# Patient Record
Sex: FEMALE | Race: WHITE | NOT HISPANIC OR LATINO | Employment: FULL TIME | URBAN - METROPOLITAN AREA
[De-identification: names, ages, dates, MRNs, and addresses within clinical notes are randomized per-mention and may not be internally consistent; named-entity substitution may affect disease eponyms.]

---

## 2017-02-27 DIAGNOSIS — E03.8 OTHER SPECIFIED HYPOTHYROIDISM: ICD-10-CM

## 2017-02-27 RX ORDER — LEVOTHYROXINE SODIUM 25 UG/1
TABLET ORAL
Qty: 30 TABLET | Refills: 0 | Status: SHIPPED | OUTPATIENT
Start: 2017-02-27 | End: 2017-04-19

## 2017-02-27 RX ORDER — LEVOTHYROXINE SODIUM 200 UG/1
200 TABLET ORAL DAILY
Qty: 30 TABLET | Refills: 0 | Status: SHIPPED | OUTPATIENT
Start: 2017-02-27 | End: 2017-04-19

## 2017-02-27 NOTE — TELEPHONE ENCOUNTER
Last office visit with Dr. Dumont 12/1/15  Future appointment currently not scheduled.    TSH   Date Value Ref Range Status   02/12/2016 2.19 0.40 - 4.00 mU/L Final     Medication is being filled for 1 time refill only due to:  Future labs ordered today. Patient needs to be seen because it has been more than one year since last visit.   Left message with patient on identifiable voice mail that labs are due with annual visit.  Patient should call to schedule appointment.    Larisa Rose   [facility]  RN

## 2017-03-03 ENCOUNTER — TELEPHONE (OUTPATIENT)
Dept: FAMILY MEDICINE | Facility: CLINIC | Age: 52
End: 2017-03-03

## 2017-03-03 NOTE — TELEPHONE ENCOUNTER
RN hypertension panel management  Offer free BP CK with the RN  Left message for the patient to call the clinic.  Anu ARROYO RN

## 2017-03-03 NOTE — LETTER
March 7, 2017      Estella Mary  [address]  [place] [address]              Dear Estella,    Your most recent blood pressure reading preformed at our clinic was higher than we like to see it. The goal is to have it under 140/90 in clinic. Please call our clinic [phone] to schedule a blood pressure recheck on our RN schedule. This appointment is free of charge and takes about 15 minutes to complete. Be sure to take all of your blood pressure medications, and avoid stimulants like caffeine, cold medicines, sudafed, or tobacco prior to your recheck.    If your blood pressure medication was changed by your provider recently wait 2 weeks before making this recheck appointment.     Thank you for trusting us with your health care.  Sincerely,    Etelvina Townsend MD/ Anu ARROYO RN

## 2017-04-10 DIAGNOSIS — I10 ESSENTIAL HYPERTENSION: ICD-10-CM

## 2017-04-10 NOTE — TELEPHONE ENCOUNTER
HCTZ      Last Written Prescription Date: 12/01/16  Last Fill Quantity: 90, # refills: 0  Last Office Visit with [facility] or [facility] prescribing provider: 09/08/16       Potassium   Date Value Ref Range Status   12/17/2015 3.7 3.4 - 5.3 mmol/L Final     Creatinine   Date Value Ref Range Status   12/17/2015 0.94 0.52 - 1.04 mg/dL Final     BP Readings from Last 3 Encounters:   09/08/16 (!) 145/94   05/16/16 138/88   03/23/16 (!) 144/93

## 2017-04-13 RX ORDER — HYDROCHLOROTHIAZIDE 25 MG/1
TABLET ORAL
Qty: 30 TABLET | Refills: 0 | Status: SHIPPED | OUTPATIENT
Start: 2017-04-13 | End: 2017-04-19

## 2017-04-19 ENCOUNTER — RESULT FOLLOW UP (OUTPATIENT)
Dept: FAMILY MEDICINE | Facility: CLINIC | Age: 52
End: 2017-04-19

## 2017-04-19 ENCOUNTER — OFFICE VISIT (OUTPATIENT)
Dept: FAMILY MEDICINE | Facility: CLINIC | Age: 52
End: 2017-04-19
Payer: COMMERCIAL

## 2017-04-19 VITALS
HEIGHT: 66 IN | SYSTOLIC BLOOD PRESSURE: 142 MMHG | HEART RATE: 79 BPM | WEIGHT: 263 LBS | DIASTOLIC BLOOD PRESSURE: 90 MMHG | BODY MASS INDEX: 42.27 KG/M2 | TEMPERATURE: 96.2 F

## 2017-04-19 DIAGNOSIS — Z80.41 FAMILY HISTORY OF OVARIAN CANCER: ICD-10-CM

## 2017-04-19 DIAGNOSIS — Z13.6 CARDIOVASCULAR SCREENING; LDL GOAL LESS THAN 130: ICD-10-CM

## 2017-04-19 DIAGNOSIS — E66.01 MORBID OBESITY DUE TO EXCESS CALORIES (H): ICD-10-CM

## 2017-04-19 DIAGNOSIS — Z98.890 S/P LEEP OF CERVIX: ICD-10-CM

## 2017-04-19 DIAGNOSIS — Z91.013 SEAFOOD ALLERGY: ICD-10-CM

## 2017-04-19 DIAGNOSIS — I10 ESSENTIAL HYPERTENSION: ICD-10-CM

## 2017-04-19 DIAGNOSIS — Z82.49 FAMILY HISTORY OF ISCHEMIC HEART DISEASE: ICD-10-CM

## 2017-04-19 DIAGNOSIS — E03.8 OTHER SPECIFIED HYPOTHYROIDISM: ICD-10-CM

## 2017-04-19 DIAGNOSIS — Z01.419 PAP SMEAR, LOW-RISK: ICD-10-CM

## 2017-04-19 DIAGNOSIS — E03.9 ACQUIRED HYPOTHYROIDISM: ICD-10-CM

## 2017-04-19 DIAGNOSIS — I10 BENIGN ESSENTIAL HYPERTENSION: ICD-10-CM

## 2017-04-19 DIAGNOSIS — Z13.1 SCREENING FOR DIABETES MELLITUS: Primary | ICD-10-CM

## 2017-04-19 DIAGNOSIS — Z87.42 HX OF ABNORMAL CERVICAL PAP SMEAR: ICD-10-CM

## 2017-04-19 LAB
ANION GAP SERPL CALCULATED.3IONS-SCNC: 6 MMOL/L (ref 3–14)
BUN SERPL-MCNC: 14 MG/DL (ref 7–30)
CALCIUM SERPL-MCNC: 9.2 MG/DL (ref 8.5–10.1)
CANCER AG125 SERPL-ACNC: 8 U/ML (ref 0–30)
CHLORIDE SERPL-SCNC: 100 MMOL/L (ref 94–109)
CHOLEST SERPL-MCNC: 240 MG/DL
CO2 SERPL-SCNC: 31 MMOL/L (ref 20–32)
CREAT SERPL-MCNC: 0.88 MG/DL (ref 0.52–1.04)
GFR SERPL CREATININE-BSD FRML MDRD: 69 ML/MIN/1.7M2
GLUCOSE SERPL-MCNC: 87 MG/DL (ref 70–99)
HDLC SERPL-MCNC: 65 MG/DL
LDLC SERPL CALC-MCNC: 147 MG/DL
NONHDLC SERPL-MCNC: 175 MG/DL
POTASSIUM SERPL-SCNC: 3.8 MMOL/L (ref 3.4–5.3)
SODIUM SERPL-SCNC: 137 MMOL/L (ref 133–144)
TRIGL SERPL-MCNC: 141 MG/DL
TSH SERPL DL<=0.005 MIU/L-ACNC: 0.71 MU/L (ref 0.4–4)

## 2017-04-19 PROCEDURE — 88175 CYTOPATH C/V AUTO FLUID REDO: CPT | Performed by: NURSE PRACTITIONER

## 2017-04-19 PROCEDURE — 86304 IMMUNOASSAY TUMOR CA 125: CPT | Performed by: NURSE PRACTITIONER

## 2017-04-19 PROCEDURE — 80061 LIPID PANEL: CPT | Performed by: NURSE PRACTITIONER

## 2017-04-19 PROCEDURE — 88141 CYTOPATH C/V INTERPRET: CPT | Performed by: NURSE PRACTITIONER

## 2017-04-19 PROCEDURE — 99396 PREV VISIT EST AGE 40-64: CPT | Performed by: NURSE PRACTITIONER

## 2017-04-19 PROCEDURE — 84443 ASSAY THYROID STIM HORMONE: CPT | Performed by: NURSE PRACTITIONER

## 2017-04-19 PROCEDURE — 36415 COLL VENOUS BLD VENIPUNCTURE: CPT | Performed by: NURSE PRACTITIONER

## 2017-04-19 PROCEDURE — 80048 BASIC METABOLIC PNL TOTAL CA: CPT | Performed by: NURSE PRACTITIONER

## 2017-04-19 PROCEDURE — 87624 HPV HI-RISK TYP POOLED RSLT: CPT | Performed by: NURSE PRACTITIONER

## 2017-04-19 RX ORDER — LEVOTHYROXINE SODIUM 25 UG/1
TABLET ORAL
Qty: 90 TABLET | Refills: 3 | Status: SHIPPED | OUTPATIENT
Start: 2017-04-19 | End: 2024-03-16

## 2017-04-19 RX ORDER — LEVOTHYROXINE SODIUM 25 UG/1
TABLET ORAL
Qty: 90 TABLET | Refills: 3 | Status: SHIPPED | OUTPATIENT
Start: 2017-04-19 | End: 2017-05-19

## 2017-04-19 RX ORDER — LEVOTHYROXINE SODIUM 200 UG/1
200 TABLET ORAL DAILY
Qty: 90 TABLET | Refills: 3 | Status: SHIPPED | OUTPATIENT
Start: 2017-04-19

## 2017-04-19 RX ORDER — EPINEPHRINE 0.3 MG/.3ML
0.3 INJECTION SUBCUTANEOUS
Qty: 0.3 ML | Refills: 0 | Status: SHIPPED | OUTPATIENT
Start: 2017-04-19

## 2017-04-19 RX ORDER — HYDROCHLOROTHIAZIDE 25 MG/1
25 TABLET ORAL DAILY
Qty: 90 TABLET | Refills: 3 | Status: SHIPPED | OUTPATIENT
Start: 2017-04-19 | End: 2018-04-09

## 2017-04-19 NOTE — LETTER
November 15, 2017      Estella BLAINE Usman  [address] [place] [address]    Dear ,      At [facility], your health and wellness is our primary concern. That is why we are following up on an abnormal pap from 4/19/17, which was reported as LSIL and positive for high risk HPV. Your provider had recommended that you have a Colposcopy completed by 7/19/17. Our records do not show that this has been done.      It is important to complete the follow up that your provider has suggested for you to ensure that there are no worsening changes which may, over time, develop into cancer.      If you have chosen not to do the recommended colposcopy, please contact our office at [phone] to schedule an appointment for a repeat PAP smear and HPV test at your earliest convenience.    If you have completed the tests outside of [facility], please have the results forwarded to our office. We will update the chart for your primary Physician to review before your next annual physical.     Thank you for choosing [facility]!    Sincerely,      Lupe Celestin, BRITTANY CNP/rlm

## 2017-04-19 NOTE — MR AVS SNAPSHOT
After Visit Summary   4/19/2017    Estella Mary    MRN: [id number]           Patient Information     Date Of Birth          1965        Visit Information        Provider Department      4/19/2017 7:00 AM Lupe Celestin APRN [facility]        Today's Diagnoses     Screening for diabetes mellitus    -  1    Seafood allergy        CARDIOVASCULAR SCREENING; LDL GOAL LESS THAN 130        Family history of ovarian cancer        Family history of ischemic heart disease        Acquired hypothyroidism        Benign essential hypertension        Hx of abnormal cervical Pap smear          Care Instructions      Preventive Health Recommendations  Female Ages 40 to 49    Yearly exam:     See your health care provider every year in order to  1. Review health changes.   2. Discuss preventive care.    3. Review your medicines if your doctor prescribed any.      Get a Pap test every three years (unless you have an abnormal result and your provider advises testing more often).      If you get Pap tests with HPV test, you only need to test every 5 years, unless you have an abnormal result. You do not need a Pap test if your uterus was removed (hysterectomy) and you have not had cancer.      You should be tested each year for STDs (sexually transmitted diseases), if you're at risk.       Ask your doctor if you should have a mammogram.      Have a colonoscopy (test for colon cancer) if someone in your family has had colon cancer or polyps before age 50.       Have a cholesterol test every 5 years.       Have a diabetes test (fasting glucose) after age 45. If you are at risk for diabetes, you should have this test every 3 years.    Shots: Get a flu shot each year. Get a tetanus shot every 10 years.     Nutrition:     Eat at least 5 servings of fruits and vegetables each day.    Eat whole-grain bread, whole-wheat pasta and brown rice instead of white grains and rice.    Talk to your provider  "about Calcium and Vitamin D.     Lifestyle    Exercise at least 150 minutes a week (an average of 30 minutes a day, 5 days a week). This will help you control your weight and prevent disease.    Limit alcohol to one drink per day.    No smoking.     Wear sunscreen to prevent skin cancer.    See your dentist every six months for an exam and cleaning.        Follow-ups after your visit        Who to contact     If you have questions or need follow up information about today's clinic visit or your schedule please contact [facility] directly at [phone].  Normal or non-critical lab and imaging results will be communicated to you by "[employer]hart, letter or phone within 4 business days after the clinic has received the results. If you do not hear from us within 7 days, please contact the clinic through [employer] or phone. If you have a critical or abnormal lab result, we will notify you by phone as soon as possible.  Submit refill requests through [employer] or call your pharmacy and they will forward the refill request to us. Please allow 3 business days for your refill to be completed.          Additional Information About Your Visit        "[employer]harNew Screens Information     [employer] lets you send messages to your doctor, view your test results, renew your prescriptions, schedule appointments and more. To sign up, go to [email or web address] . Click on \"Log in\" on the left side of the screen, which will take you to the Welcome page. Then click on \"Sign up Now\" on the right side of the page.     You will be asked to enter the access code listed below, as well as some personal information. Please follow the directions to create your username and password.     Your access code is: [email or web address]  Expires: 2017  7:39 AM     Your access code will  in 90 days. If you need help or a new code, please call your [facility] or [phone].        Care EveryWhere ID     This is your Care EveryWhere ID. This could be " "used by other organizations to access your [place] medical records  [id number]        Your Vitals Were     Pulse Temperature Height BMI (Body Mass Index)          79 96.2  F (35.7  C) (Tympanic) 5' 6\" (1.676 m) 42.45 kg/m2         Blood Pressure from Last 3 Encounters:   04/19/17 (!) 139/96   09/08/16 (!) 145/94   05/16/16 138/88    Weight from Last 3 Encounters:   04/19/17 263 lb (119.3 kg)   09/08/16 256 lb (116.1 kg)   12/17/15 240 lb 12.8 oz (109.2 kg)              We Performed the Following     Basic metabolic panel          HPV High Risk Types DNA Cervical     Lipid Profile with reflex to direct LDL     Pap imaged thin layer diagnostic with HPV (select HPV order below)     TSH with free T4 reflex          Where to get your medicines      [facility] [address]   [address] [facility] [address]     Phone:  [phone]     EPINEPHrine 0.3 MG/0.3ML injection          Primary Care Provider Office Phone # Fax #    Lupe SerraBRITTANY brooke [facility] [phone]       [facility] [address] [facility] [address]        Thank you!     Thank you for choosing [facility]  for your care. Our goal is always to provide you with excellent care. Hearing back from our patients is one way we can continue to improve our services. Please take a few minutes to complete the written survey that you may receive in the mail after your visit with us. Thank you!             Your Updated Medication List - Protect others around you: Learn how to safely use, store and throw away your medicines at www.disposemymeds.org.          This list is accurate as of: 4/19/17  7:43 AM.  Always use your most recent med list.                   Brand Name Dispense Instructions for use    EPINEPHrine 0.3 MG/0.3ML injection     0.3 mL    Inject 0.3 mLs (0.3 mg) into the muscle once as needed       hydrochlorothiazide 25 MG tablet    " HYDRODIURIL    30 tablet    TAKE ONE TABLET BY MOUTH ONCE DAILY       hyoscyamine 0.125 MG sublingual tablet    LEVSIN/SL    90 tablet    Take 1 tablet (0.125 mg) by mouth every 4 hours as needed for cramping       levonorgestrel 20 MCG/24HR IUD    MIRENA    1 each    1 each (20 mcg) by Intrauterine route once for 1 dose       * levothyroxine 200 MCG tablet    SYNTHROID/LEVOTHROID    30 tablet    Take 1 tablet (200 mcg) by mouth daily Annual due with labs for additional refills.       * levothyroxine 25 MCG tablet    SYNTHROID/LEVOTHROID    30 tablet    Take one tablet daily in addition to 200mcg daily of Levothyroxine. Annual visit with labs for additional refills.       ZOLMitriptan 5 MG tablet    ZOMIG    9 tablet    Take 1 tablet (5 mg) by mouth at onset of headache for migraine MAY REPEAT IN 2 HOURS AS NEEDED       * Notice:  This list has 2 medication(s) that are the same as other medications prescribed for you. Read the directions carefully, and ask your doctor or other care provider to review them with you.

## 2017-04-19 NOTE — NURSING NOTE
"Chief Complaint   Patient presents with     Physical     annual exam, pt is fasting        Initial BP (!) 139/96 (BP Location: Left arm, Patient Position: Chair, Cuff Size: Adult Large)  Pulse 79  Temp 96.2  F (35.7  C) (Tympanic)  Ht 5' 6\" (1.676 m)  Wt 263 lb (119.3 kg)  BMI 42.45 kg/m2 Estimated body mass index is 42.45 kg/(m^2) as calculated from the following:    Height as of this encounter: 5' 6\" (1.676 m).    Weight as of this encounter: 263 lb (119.3 kg).  Medication Reconciliation: complete    "

## 2017-04-19 NOTE — LETTER
November 27, 2017      Estella BLAINE Usman  [address] [place] [address]    Dear ,      At [facility], your health and wellness is our primary concern. That is why we are following up on an abnormal pap from 4/19/17, which was reported as LSIL and positive for high risk HPV. Your provider had recommended that you have a Colposcopy completed by 7/19/17. Our records do not show that this has been done.      It is important to complete the follow up that your provider has suggested for you to ensure that there are no worsening changes which may, over time, develop into cancer.      If you have chosen not to do the recommended colposcopy, please contact our office at [phone] to schedule an appointment for a repeat PAP smear and HPV test at your earliest convenience.    If you have completed the tests outside of [facility], please have the results forwarded to our office. We will update the chart for your primary Physician to review before your next annual physical.     Thank you for choosing [facility]!    Sincerely,      Lupe Celestin, BRITTANY CNP/rlm

## 2017-04-19 NOTE — NURSING NOTE
"Initial /90  Pulse 79  Temp 96.2  F (35.7  C) (Tympanic)  Ht 5' 6\" (1.676 m)  Wt 263 lb (119.3 kg)  BMI 42.45 kg/m2 Estimated body mass index is 42.45 kg/(m^2) as calculated from the following:    Height as of this encounter: 5' 6\" (1.676 m).    Weight as of this encounter: 263 lb (119.3 kg). .    Isi Ferrer    "
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
## 2017-04-24 LAB
COPATH REPORT: ABNORMAL
PAP: ABNORMAL

## 2017-04-25 LAB
FINAL DIAGNOSIS: ABNORMAL
HPV HR 12 DNA CVX QL NAA+PROBE: POSITIVE
HPV16 DNA SPEC QL NAA+PROBE: NEGATIVE
HPV18 DNA SPEC QL NAA+PROBE: NEGATIVE
SPECIMEN DESCRIPTION: ABNORMAL

## 2017-04-26 NOTE — PROGRESS NOTES
Managed by [facility]  Mark GRAYSON 1997  9/15/14: Pap - NIL, Neg high risk HPV with endometrial cells present. Plan cotest in 1 year. If ALHAJI 2/3 on biopsy - PAP/HPV co-testing at 12, 24 months. If two negative results repeat co-testing in 3 years, if negative then routine screening.  12/1/15:Pap-LSIL, + HR HPV. Plan colp.   7/13/16: [place] not done. Tracking updated for 6 mo pap.  8/11/16: [facility] signed on 12/23/15 for [facility]. Pap file closed.   4/19/17:Pap: LSIL, +HR HPV (Neg 16/18). Plan [place]-  4/27/17: Message left to return call.   5/4/17: Message left to return call.   5/11/17 Message left to return call.   5/19/17 Pt read result on [facility].   11/5/17 [place] not done within 3 months. Tracking updated for 6 mo colp/pap.   11/15/17 [place]/Pap reminder letter sent (rlm)  11/27/17 My Chart not read, reminder letter sent (rlm)  06/06/18 LMTC and schedule at [facility]. ([facility])  06/20/18 Patient is lost to pap tracking follow-up. FYI routed to provider. ([facility])

## 2017-05-15 ENCOUNTER — TELEPHONE (OUTPATIENT)
Dept: FAMILY MEDICINE | Facility: CLINIC | Age: 52
End: 2017-05-15

## 2017-05-15 NOTE — TELEPHONE ENCOUNTER
I don't see this med on her chart at all before.   Left message on answering machine for patient to call back. What does she take this for? Has she ever talked to [facility]/ primary care clinic  about it? May need an appt?       Azalia Maguire RNC

## 2017-05-17 ENCOUNTER — TELEPHONE (OUTPATIENT)
Dept: FAMILY MEDICINE | Facility: CLINIC | Age: 52
End: 2017-05-17

## 2017-05-17 DIAGNOSIS — K58.9 IRRITABLE BOWEL SYNDROME, UNSPECIFIED TYPE: Primary | ICD-10-CM

## 2017-05-17 NOTE — TELEPHONE ENCOUNTER
Panel Management Review      Patient has the following on her problem list:     Hypertension   Last three blood pressure readings:  BP Readings from Last 3 Encounters:   04/19/17 142/90   09/08/16 (!) 145/94   05/16/16 138/88     Blood pressure: FAILED    HTN Guidelines:  Age 18-59 BP range:  Less than 140/90  Age 60-85 with Diabetes:  Less than 140/90  Age 60-85 without Diabetes:  less than 150/90      Summary:    Patient is due/failing the following:   BP CHECK    Action needed:   Patient needs office visit for  Nurse BP check.    Type of outreach:    Phone, left message for patient to call back.     Questions for provider review:    None                                                                                                                                    Isi Ferrer       Chart routed to Care Team .

## 2017-05-17 NOTE — TELEPHONE ENCOUNTER
hyoscyamine      Last Written Prescription Date: 09/15/2014  Last Fill Quantity: 90,  # refills: 0   Last Office Visit with [facility], [facility] or [facility] prescribing provider: 04/19/2017

## 2017-05-19 ENCOUNTER — OFFICE VISIT (OUTPATIENT)
Dept: FAMILY MEDICINE | Facility: CLINIC | Age: 52
End: 2017-05-19
Payer: COMMERCIAL

## 2017-05-19 VITALS
WEIGHT: 268 LBS | RESPIRATION RATE: 16 BRPM | SYSTOLIC BLOOD PRESSURE: 139 MMHG | TEMPERATURE: 97.3 F | DIASTOLIC BLOOD PRESSURE: 89 MMHG | HEIGHT: 66 IN | BODY MASS INDEX: 43.07 KG/M2 | HEART RATE: 72 BPM

## 2017-05-19 DIAGNOSIS — R30.0 DYSURIA: ICD-10-CM

## 2017-05-19 DIAGNOSIS — R82.90 NONSPECIFIC FINDING ON EXAMINATION OF URINE: ICD-10-CM

## 2017-05-19 DIAGNOSIS — N39.0 URINARY TRACT INFECTION WITHOUT HEMATURIA, SITE UNSPECIFIED: Primary | ICD-10-CM

## 2017-05-19 LAB
ALBUMIN UR-MCNC: 30 MG/DL
APPEARANCE UR: ABNORMAL
BACTERIA #/AREA URNS HPF: ABNORMAL /HPF
BILIRUB UR QL STRIP: NEGATIVE
COLOR UR AUTO: YELLOW
GLUCOSE UR STRIP-MCNC: NEGATIVE MG/DL
HGB UR QL STRIP: ABNORMAL
KETONES UR STRIP-MCNC: NEGATIVE MG/DL
LEUKOCYTE ESTERASE UR QL STRIP: ABNORMAL
NITRATE UR QL: NEGATIVE
NON-SQ EPI CELLS #/AREA URNS LPF: ABNORMAL /LPF
PH UR STRIP: 5.5 PH (ref 5–7)
RBC #/AREA URNS AUTO: ABNORMAL /HPF (ref 0–2)
SP GR UR STRIP: 1.02 (ref 1–1.03)
URN SPEC COLLECT METH UR: ABNORMAL
UROBILINOGEN UR STRIP-ACNC: 0.2 EU/DL (ref 0.2–1)
WBC #/AREA URNS AUTO: ABNORMAL /HPF (ref 0–2)

## 2017-05-19 PROCEDURE — 81001 URINALYSIS AUTO W/SCOPE: CPT | Performed by: NURSE PRACTITIONER

## 2017-05-19 PROCEDURE — 99213 OFFICE O/P EST LOW 20 MIN: CPT | Performed by: NURSE PRACTITIONER

## 2017-05-19 PROCEDURE — 87086 URINE CULTURE/COLONY COUNT: CPT | Performed by: NURSE PRACTITIONER

## 2017-05-19 RX ORDER — SULFAMETHOXAZOLE/TRIMETHOPRIM 800-160 MG
1 TABLET ORAL 2 TIMES DAILY
Qty: 14 TABLET | Refills: 0 | Status: SHIPPED | OUTPATIENT
Start: 2017-05-19 | End: 2024-03-16

## 2017-05-19 NOTE — NURSING NOTE
"Chief Complaint   Patient presents with     Urinary Problem       Initial BP (!) 150/95 (BP Location: Right arm, Patient Position: Chair, Cuff Size: Adult Large)  Pulse 72  Temp 97.3  F (36.3  C) (Oral)  Resp 16  Ht 5' 6\" (1.676 m)  Wt 268 lb (121.6 kg)  BMI 43.26 kg/m2 Estimated body mass index is 43.26 kg/(m^2) as calculated from the following:    Height as of this encounter: 5' 6\" (1.676 m).    Weight as of this encounter: 268 lb (121.6 kg).  Medication Reconciliation: complete  "

## 2017-05-19 NOTE — PATIENT INSTRUCTIONS
Urinary Tract Infection         What is a urinary tract infection?   A urinary tract infection (UTI) is an infection in the urinary tract. The urinary tract includes the:   kidneys   ureters (the tubes draining urine from the kidneys to the bladder)   bladder   urethra (the tube that drains urine from the bladder).   Any or all of these parts of the urinary tract can get infected.   How does it occur?   Urinary tract infection is usually caused by bacteria. Normally the urinary tract does not have any bacteria or other organisms in it. Bacteria that cause UTI often spread from the rectum or vagina to the urethra and then to the bladder or kidneys. Urinary tract infection is more common in women than men because the urethra is shorter in women. This makes it easier for bacteria to move up to the bladder. Sometimes bacteria spread from another part of the body through the bloodstream to the urinary tract.   Some of the things that can lead to an infection are:   a blockage in the urinary tract, such as a kidney stone   sexual activity   getting older, when it may get harder to empty and flush out the bladder completely.   Women are more likely to have an infection if they:   are newly sexually active or have a new sex partner   are past menopause   are pregnant   have a history of diabetes, a problem with the immune system, sickle-cell anemia, stroke, kidney stones, or any illness that makes it hard to empty the bladder completely.   What are the symptoms?   The symptoms of UTI may include:   urinating more often   feeling an urgent need to urinate   pain or discomfort (burning) when you urinate   urine that smells bad   pain in the lower pelvis, stomach, lower back, or side   urine that looks cloudy or reddish   fever or chills   sweats   nausea and vomiting   leaking of urine   change in amount of urine, either more or less   pain during sex.   How is it diagnosed?   Your healthcare provider will ask  about your symptoms and medical history. Your provider will examine you. The exam may include a pelvic exam. Your provider will check for tenderness of the bladder or kidney. A sample of your urine may be tested for bacteria and pus. If you are having fever and are feeling very ill, you may have a blood test to look for signs of more serious infection.   If you keep having infections or symptoms after treatment, your provider may suggest these tests:   An intravenous pyelogram (IVP). An IVP is a special type of X-ray of the kidneys, ureters, and bladder.   An ultrasound scan to look at the urinary tract.   A cystoscopy. This is an exam of the inside of the urethra and bladder with a small lighted instrument. It is usually done by a specialist called a urologist.   How is it treated?   UTIs are usually treated with antibiotics. Your provider can also prescribe a medicine called Pyridium to relieve burning and discomfort. (Pyridium turns your urine a dark orange color.)   If the infection is causing fever, pain, or vomiting or you have a severe kidney infection, you may need to stay at the hospital for treatment.   How long will the effects last?   With antibiotic treatment, the symptoms of a bacterial infection stop in 1 to 3 days. Take all of the antibiotic your healthcare provider prescribes, even after the symptoms go away. If you stop taking your medicine before the scheduled end of treatment, the infection may come back   Without treatment, the infection can last a long time. If it is not treated, the infection can permanently damage the bladder and kidneys, or it may spread to the blood. If the infection spreads to the blood, it can be fatal.   How can I take care of myself?   Follow your healthcare provider's treatment. Take all of the antibiotic that your healthcare provider prescribes, even when you feel better. Do not take medicine left over from previous prescriptions.   Drink more fluids, especially  water, to help flush bacteria from your system.   If you have a fever:   Take aspirin or acetaminophen to control the fever. Check with your healthcare provider before you give any medicine that contains aspirin or salicylates to a child or teen. This includes medicines like baby aspirin, some cold medicines, and Pepto Bismol. Children and teens who take aspirin are at risk for a serious illness called Reye's syndrome.   Keep a daily record of your temperature.   A hot water bottle or an electric heating pad on a low setting can help relieve cramps or lower abdominal or back pain. Keep a cloth between your skin and the hot water bottle or heating pad so that you don't burn your skin.   Soaking in a tub for 20 to 30 minutes may help relieve any back or abdominal pain.   Follow your healthcare provider's directions for a follow-up urine test. Your provider may want to test your urine soon after you finish taking the antibiotic.   Call your healthcare provider right away if:   You keep having symptoms after taking an antibiotic for 2 days.   Your symptoms get worse.   You have a fever of 101.5? F (38.6? C) or higher.   You have new vomiting.   You have new pain in your side, back, or belly.   You have any symptoms that worry you.   How can I help prevent urinary tract infection?   You can help prevent UTIs if you:   Drink lots of fluids every day.   Don't wait to go to the bathroom when you feel the need to urinate.   Empty your bladder completely when you urinate.   Use good hygiene when you use the toilet. For example, wipe from front to back to keep rectal bacteria from getting into the vagina and urethra.   Avoid using irritating cosmetics or chemicals in the area of the vagina and urethra (such as strong soaps, feminine hygiene sprays or douches, or scented napkins or panty liners).   Practice safe sex. Always use latex or polyurethane condoms.   Urinate soon after sex.   Keep your genital area clean.   Wear  underwear that is all cotton or has a cotton crotch. Pantyhose should also have a cotton crotch. Cotton allows better air circulation than nylon. Change underwear and pantyhose every day.     Published by [employer].  This content is reviewed periodically and is subject to change as new health information becomes available. The information is intended to inform and educate and is not a replacement for medical evaluation, advice, diagnosis or treatment by a healthcare professional.   Developed by Aleja Ruiz RN, [place], and [employer].   ? 2010 [facility] and/or its affiliates. All Rights Reserved.   Copyright   [employer] Reference Systems 2011            Thank you for choosing [facility].  You may be receiving a survey in the mail from [employer] regarding your visit today.  Please take a few minutes to complete and return the survey to let us know how we are doing.      Our Clinic hours are:  Mondays    7:20 am - 7 pm  Tues -  Fri  7:20 am - 5 pm    Clinic Phone: [phone]    The clinic lab opens at 7:30 am Mon - Fri and appointments are required.    [facility]  Ph. [phone]  Monday-Thursday 8 am - 7pm  Tues/Wed/Fri 8 am - 5:30 pm

## 2017-05-19 NOTE — MR AVS SNAPSHOT
After Visit Summary   5/19/2017    Estella Mary    MRN: [id number]           Patient Information     Date Of Birth          1965        Visit Information        Provider Department      5/19/2017 1:40 PM Rebeca Sher APRN [facility]        Today's Diagnoses     Urinary tract infection without hematuria, site unspecified    -  1    Dysuria        Nonspecific finding on examination of urine          Care Instructions                Urinary Tract Infection         What is a urinary tract infection?   A urinary tract infection (UTI) is an infection in the urinary tract. The urinary tract includes the:   kidneys   ureters (the tubes draining urine from the kidneys to the bladder)   bladder   urethra (the tube that drains urine from the bladder).   Any or all of these parts of the urinary tract can get infected.   How does it occur?   Urinary tract infection is usually caused by bacteria. Normally the urinary tract does not have any bacteria or other organisms in it. Bacteria that cause UTI often spread from the rectum or vagina to the urethra and then to the bladder or kidneys. Urinary tract infection is more common in women than men because the urethra is shorter in women. This makes it easier for bacteria to move up to the bladder. Sometimes bacteria spread from another part of the body through the bloodstream to the urinary tract.   Some of the things that can lead to an infection are:   a blockage in the urinary tract, such as a kidney stone   sexual activity   getting older, when it may get harder to empty and flush out the bladder completely.   Women are more likely to have an infection if they:   are newly sexually active or have a new sex partner   are past menopause   are pregnant   have a history of diabetes, a problem with the immune system, sickle-cell anemia, stroke, kidney stones, or any illness that makes it hard to empty the bladder completely.   What  are the symptoms?   The symptoms of UTI may include:   urinating more often   feeling an urgent need to urinate   pain or discomfort (burning) when you urinate   urine that smells bad   pain in the lower pelvis, stomach, lower back, or side   urine that looks cloudy or reddish   fever or chills   sweats   nausea and vomiting   leaking of urine   change in amount of urine, either more or less   pain during sex.   How is it diagnosed?   Your healthcare provider will ask about your symptoms and medical history. Your provider will examine you. The exam may include a pelvic exam. Your provider will check for tenderness of the bladder or kidney. A sample of your urine may be tested for bacteria and pus. If you are having fever and are feeling very ill, you may have a blood test to look for signs of more serious infection.   If you keep having infections or symptoms after treatment, your provider may suggest these tests:   An intravenous pyelogram (IVP). An IVP is a special type of X-ray of the kidneys, ureters, and bladder.   An ultrasound scan to look at the urinary tract.   A cystoscopy. This is an exam of the inside of the urethra and bladder with a small lighted instrument. It is usually done by a specialist called a urologist.   How is it treated?   UTIs are usually treated with antibiotics. Your provider can also prescribe a medicine called Pyridium to relieve burning and discomfort. (Pyridium turns your urine a dark orange color.)   If the infection is causing fever, pain, or vomiting or you have a severe kidney infection, you may need to stay at the hospital for treatment.   How long will the effects last?   With antibiotic treatment, the symptoms of a bacterial infection stop in 1 to 3 days. Take all of the antibiotic your healthcare provider prescribes, even after the symptoms go away. If you stop taking your medicine before the scheduled end of treatment, the infection may come back   Without treatment, the  infection can last a long time. If it is not treated, the infection can permanently damage the bladder and kidneys, or it may spread to the blood. If the infection spreads to the blood, it can be fatal.   How can I take care of myself?   Follow your healthcare provider's treatment. Take all of the antibiotic that your healthcare provider prescribes, even when you feel better. Do not take medicine left over from previous prescriptions.   Drink more fluids, especially water, to help flush bacteria from your system.   If you have a fever:   Take aspirin or acetaminophen to control the fever. Check with your healthcare provider before you give any medicine that contains aspirin or salicylates to a child or teen. This includes medicines like baby aspirin, some cold medicines, and Pepto Bismol. Children and teens who take aspirin are at risk for a serious illness called Reye's syndrome.   Keep a daily record of your temperature.   A hot water bottle or an electric heating pad on a low setting can help relieve cramps or lower abdominal or back pain. Keep a cloth between your skin and the hot water bottle or heating pad so that you don't burn your skin.   Soaking in a tub for 20 to 30 minutes may help relieve any back or abdominal pain.   Follow your healthcare provider's directions for a follow-up urine test. Your provider may want to test your urine soon after you finish taking the antibiotic.   Call your healthcare provider right away if:   You keep having symptoms after taking an antibiotic for 2 days.   Your symptoms get worse.   You have a fever of 101.5? F (38.6? C) or higher.   You have new vomiting.   You have new pain in your side, back, or belly.   You have any symptoms that worry you.   How can I help prevent urinary tract infection?   You can help prevent UTIs if you:   Drink lots of fluids every day.   Don't wait to go to the bathroom when you feel the need to urinate.   Empty your bladder completely when you  urinate.   Use good hygiene when you use the toilet. For example, wipe from front to back to keep rectal bacteria from getting into the vagina and urethra.   Avoid using irritating cosmetics or chemicals in the area of the vagina and urethra (such as strong soaps, feminine hygiene sprays or douches, or scented napkins or panty liners).   Practice safe sex. Always use latex or polyurethane condoms.   Urinate soon after sex.   Keep your genital area clean.   Wear underwear that is all cotton or has a cotton crotch. Pantyhose should also have a cotton crotch. Cotton allows better air circulation than nylon. Change underwear and pantyhose every day.     Published by [employer].  This content is reviewed periodically and is subject to change as new health information becomes available. The information is intended to inform and educate and is not a replacement for medical evaluation, advice, diagnosis or treatment by a healthcare professional.   Developed by Aleja Ruiz RN, [place], and [employer].   ? 2010 [facility] and/or its affiliates. All Rights Reserved.   Copyright   Clinical Reference Systems 2011            Thank you for choosing [facility].  You may be receiving a survey in the mail from [employer] regarding your visit today.  Please take a few minutes to complete and return the survey to let us know how we are doing.      Our Clinic hours are:  Mondays    7:20 am - 7 pm  Tues -  Fri  7:20 am - 5 pm    Clinic Phone: [phone]    The clinic lab opens at 7:30 am Mon - Fri and appointments are required.    [facility]. [phone]  Monday-Thursday 8 am - 7pm  Tues/Wed/Fri 8 am - 5:30 pm               Follow-ups after your visit        Your next 10 appointments already scheduled     Jun 12, 2017  7:00 PM CDT   MA SCREENING DIGITAL BILATERAL with [id number]   [facility] ([facility])    [address]  [facility] [address] [phone]            "Do not use any powder, lotion or deodorant under your arms or on your breast. If you do, we will ask you to remove it before your exam.  Wear comfortable, two-piece clothing.  If you have any allergies, tell your care team.  Bring any previous mammograms from other facilities or have them mailed to the breast center.              Who to contact     If you have questions or need follow up information about today's clinic visit or your schedule please contact [facility] directly at [phone].  Normal or non-critical lab and imaging results will be communicated to you by [employer], letter or phone within 4 business days after the clinic has received the results. If you do not hear from us within 7 days, please contact the clinic through [employer] or phone. If you have a critical or abnormal lab result, we will notify you by phone as soon as possible.  Submit refill requests through [employer] or call your pharmacy and they will forward the refill request to us. Please allow 3 business days for your refill to be completed.          Additional Information About Your Visit        [facility] Information     [employer] gives you secure access to your electronic health record. If you see a primary care provider, you can also send messages to your care team and make appointments. If you have questions, please call your primary care clinic.  If you do not have a primary care provider, please call [phone] and they will assist you.        Care EveryWhere ID     This is your Care EveryWhere ID. This could be used by other organizations to access your [facility] medical records  [id number]        Your Vitals Were     Pulse Temperature Respirations Height BMI (Body Mass Index)       72 97.3  F (36.3  C) (Oral) 16 5' 6\" (1.676 m) 43.26 kg/m2        Blood Pressure from Last 3 Encounters:   05/19/17 139/89   04/19/17 142/90   09/08/16 (!) 145/94    Weight from Last 3 Encounters:   05/19/17 268 lb (121.6 kg)   04/19/17 " 263 lb (119.3 kg)   09/08/16 256 lb (116.1 kg)              We Performed the Following     UA reflex to Microscopic and Culture     Urine Culture Aerobic Bacterial     Urine Microscopic          Today's Medication Changes          These changes are accurate as of: 5/19/17  2:14 PM.  If you have any questions, ask your nurse or doctor.               Start taking these medicines.        Dose/Directions    sulfamethoxazole-trimethoprim 800-160 MG per tablet   Commonly known as:  BACTRIM DS/SEPTRA DS   Used for:  Urinary tract infection without hematuria, site unspecified   Started by:  Rebeca Sher APRN CNP        Dose:  1 tablet   Take 1 tablet by mouth 2 times daily   Quantity:  14 tablet   Refills:  0            Where to get your medicines      These medications were sent to [facility] [address]   [address] [facility] [address]     Phone:  [phone]     sulfamethoxazole-trimethoprim 800-160 MG per tablet                Primary Care Provider Office Phone # Fax #    Lupe BRITTANY Shepherd [phone]       [facility] [address] [facility] [address]        Thank you!     Thank you for choosing [facility]  for your care. Our goal is always to provide you with excellent care. Hearing back from our patients is one way we can continue to improve our services. Please take a few minutes to complete the written survey that you may receive in the mail after your visit with us. Thank you!             Your Updated Medication List - Protect others around you: Learn how to safely use, store and throw away your medicines at www.disposemymeds.org.          This list is accurate as of: 5/19/17  2:14 PM.  Always use your most recent med list.                   Brand Name Dispense Instructions for use    EPINEPHrine 0.3 MG/0.3ML injection     0.3 mL    Inject 0.3 mLs (0.3 mg) into the muscle once as needed        hydrochlorothiazide 25 MG tablet    HYDRODIURIL    90 tablet    Take 1 tablet (25 mg) by mouth daily       hyoscyamine 0.125 MG sublingual tablet    LEVSIN/SL    30 tablet    Take 1 tablet (0.125 mg) by mouth every 4 hours as needed for cramping       levonorgestrel 20 MCG/24HR IUD    MIRENA    1 each    1 each (20 mcg) by Intrauterine route once for 1 dose       * levothyroxine 200 MCG tablet    SYNTHROID/LEVOTHROID    90 tablet    Take 1 tablet (200 mcg) by mouth daily Annual due with labs for additional refills.       * levothyroxine 25 MCG tablet    SYNTHROID/LEVOTHROID    90 tablet    Take one tablet daily in addition to 200mcg daily of Levothyroxine. Annual visit with labs for additional refills.       sulfamethoxazole-trimethoprim 800-160 MG per tablet    BACTRIM DS/SEPTRA DS    14 tablet    Take 1 tablet by mouth 2 times daily       ZOLMitriptan 5 MG tablet    ZOMIG    9 tablet    Take 1 tablet (5 mg) by mouth at onset of headache for migraine MAY REPEAT IN 2 HOURS AS NEEDED       * Notice:  This list has 2 medication(s) that are the same as other medications prescribed for you. Read the directions carefully, and ask your doctor or other care provider to review them with you.

## 2017-05-19 NOTE — PROGRESS NOTES
SUBJECTIVE:                                                    Estella Mary is a 51 year old female who presents to clinic today for the following health issues:      URINARY TRACT SYMPTOMS      Duration: started Tuesday went away Wed and then came today.    Description  frequency, urgency, hematuria and odor    Intensity:  moderate    Accompanying signs and symptoms:  Fever/chills: YES  Flank pain no   Nausea and vomiting: no   Vaginal symptoms: none  Abdominal/Pelvic Pain: no     History  History of frequent UTI's: no   History of kidney stones: no   Sexually Active: YES  Possibility of pregnancy: No    Precipitating or alleviating factors: headache    Therapies tried and outcome: none   Outcome: na           Problem list and histories reviewed & adjusted, as indicated.  Additional history: states she had a UTI last fall.   She held her urine recently during meetings, otherwise no obvious reasoning for another UTI.  Denies fever or chills.   No other concerns voiced today.    Patient Active Problem List   Diagnosis     Hypothyroidism     Irritable bowel syndrome     Other type of migraine     Family history of malignant neoplasm of ovary     Hyperlipidemia     Obesity     FAM HX-CARDIOVAS DIS NEC     S/P LEEP of cervix     HYPERLIPIDEMIA LDL GOAL <160     HTN (hypertension)     Seafood allergy     Abnormal liver enzymes     IUD (intrauterine device) in place     Past Surgical History:   Procedure Laterality Date     C REMOVAL GALLBLADDER  2003     LEEP TX, CERVICAL         Social History   Substance Use Topics     Smoking status: Never Smoker     Smokeless tobacco: Never Used     Alcohol use Yes      Comment: occ     Family History   Problem Relation Age of Onset     CANCER Mother      ovarian     Hypertension Mother      Lipids Mother      HEART DISEASE Father      CABG at 63     Lipids Father      CANCER Maternal Grandmother      NITHYAAJEFF. Maternal Grandfather      MELISSA. Paternal Grandfather          Current  "Outpatient Prescriptions   Medication Sig Dispense Refill     sulfamethoxazole-trimethoprim (BACTRIM DS/SEPTRA DS) 800-160 MG per tablet Take 1 tablet by mouth 2 times daily 14 tablet 0     hyoscyamine (LEVSIN/SL) 0.125 MG sublingual tablet Take 1 tablet (0.125 mg) by mouth every 4 hours as needed for cramping 30 tablet 0     EPINEPHrine 0.3 MG/0.3ML injection Inject 0.3 mLs (0.3 mg) into the muscle once as needed 0.3 mL 0     hydrochlorothiazide (HYDRODIURIL) 25 MG tablet Take 1 tablet (25 mg) by mouth daily 90 tablet 3     levothyroxine (SYNTHROID/LEVOTHROID) 200 MCG tablet Take 1 tablet (200 mcg) by mouth daily Annual due with labs for additional refills. 90 tablet 3     levothyroxine (SYNTHROID/LEVOTHROID) 25 MCG tablet Take one tablet daily in addition to 200mcg daily of Levothyroxine. Annual visit with labs for additional refills. 90 tablet 3     ZOLMitriptan (ZOMIG) 5 MG tablet Take 1 tablet (5 mg) by mouth at onset of headache for migraine MAY REPEAT IN 2 HOURS AS NEEDED 9 tablet 6     [DISCONTINUED] levothyroxine (SYNTHROID/LEVOTHROID) 25 MCG tablet Take one tablet daily in addition to 200mcg daily of Levothyroxine. 90 tablet 3     levonorgestrel (MIRENA) 20 MCG/24HR IUD 1 each (20 mcg) by Intrauterine route once for 1 dose 1 each 0     Allergies   Allergen Reactions     Shellfish-Derived Products Anaphylaxis     Nkda [No Known Drug Allergies]        Reviewed and updated as needed this visit by clinical staff  Tobacco  Allergies  Med Hx  Surg Hx  Fam Hx  Soc Hx      Reviewed and updated as needed this visit by Provider          ROS: 10 point ROS neg other than the symptoms noted above in the HPI.    OBJECTIVE:                                                    /89  Pulse 72  Temp 97.3  F (36.3  C) (Oral)  Resp 16  Ht 5' 6\" (1.676 m)  Wt 268 lb (121.6 kg)  BMI 43.26 kg/m2  Body mass index is 43.26 kg/(m^2).  GENERAL: healthy, alert and no distress  HENT: pharynx without erythema  NECK: no " adenopathy, no asymmetry  RESP: lungs clear to auscultation - no rales, rhonchi or wheezes  CV: regular rate and rhythm, normal S1 S2, no S3 or S4, no murmur  ABDOMEN: soft, nontender, no hepatosplenomegaly, no masses and bowel sounds normal, no CVA tenderness  MS: no gross musculoskeletal defects noted      Diagnostic Test Results:  Results for orders placed or performed in visit on 05/19/17 (from the past 24 hour(s))   UA reflex to Microscopic and Culture   Result Value Ref Range    Color Urine Yellow     Appearance Urine Slightly Cloudy     Glucose Urine Negative NEG mg/dL    Bilirubin Urine Negative NEG    Ketones Urine Negative NEG mg/dL    Specific Gravity Urine 1.020 1.003 - 1.035    Blood Urine Large (A) NEG    pH Urine 5.5 5.0 - 7.0 pH    Protein Albumin Urine 30 (A) NEG mg/dL    Urobilinogen Urine 0.2 0.2 - 1.0 EU/dL    Nitrite Urine Negative NEG    Leukocyte Esterase Urine Moderate (A) NEG    Source Midstream Urine    Urine Microscopic   Result Value Ref Range    WBC Urine 25-50 (A) 0 - 2 /HPF    RBC Urine 10-25 (A) 0 - 2 /HPF    Squamous Epithelial /LPF Urine Moderate (A) FEW /LPF    Bacteria Urine Moderate (A) NEG /HPF        ASSESSMENT/PLAN:                                                            1. Urinary tract infection without hematuria, site unspecified    - sulfamethoxazole-trimethoprim (BACTRIM DS/SEPTRA DS) 800-160 MG per tablet; Take 1 tablet by mouth 2 times daily  Dispense: 14 tablet; Refill: 0  Discussed how to take the medication(s), expected outcomes, potential side effects.    2. Dysuria    - UA reflex to Microscopic and Culture  - Urine Microscopic    3. Nonspecific finding on examination of urine    - Urine Culture Aerobic Bacterial    See Patient Instructions  Follow up if symptoms persist or worsen and as needed.      Patient Instructions               Urinary Tract Infection         What is a urinary tract infection?   A urinary tract infection (UTI) is an infection in the  urinary tract. The urinary tract includes the:   kidneys   ureters (the tubes draining urine from the kidneys to the bladder)   bladder   urethra (the tube that drains urine from the bladder).   Any or all of these parts of the urinary tract can get infected.   How does it occur?   Urinary tract infection is usually caused by bacteria. Normally the urinary tract does not have any bacteria or other organisms in it. Bacteria that cause UTI often spread from the rectum or vagina to the urethra and then to the bladder or kidneys. Urinary tract infection is more common in women than men because the urethra is shorter in women. This makes it easier for bacteria to move up to the bladder. Sometimes bacteria spread from another part of the body through the bloodstream to the urinary tract.   Some of the things that can lead to an infection are:   a blockage in the urinary tract, such as a kidney stone   sexual activity   getting older, when it may get harder to empty and flush out the bladder completely.   Women are more likely to have an infection if they:   are newly sexually active or have a new sex partner   are past menopause   are pregnant   have a history of diabetes, a problem with the immune system, sickle-cell anemia, stroke, kidney stones, or any illness that makes it hard to empty the bladder completely.   What are the symptoms?   The symptoms of UTI may include:   urinating more often   feeling an urgent need to urinate   pain or discomfort (burning) when you urinate   urine that smells bad   pain in the lower pelvis, stomach, lower back, or side   urine that looks cloudy or reddish   fever or chills   sweats   nausea and vomiting   leaking of urine   change in amount of urine, either more or less   pain during sex.   How is it diagnosed?   Your healthcare provider will ask about your symptoms and medical history. Your provider will examine you. The exam may include a pelvic exam. Your provider will check for  tenderness of the bladder or kidney. A sample of your urine may be tested for bacteria and pus. If you are having fever and are feeling very ill, you may have a blood test to look for signs of more serious infection.   If you keep having infections or symptoms after treatment, your provider may suggest these tests:   An intravenous pyelogram (IVP). An IVP is a special type of X-ray of the kidneys, ureters, and bladder.   An ultrasound scan to look at the urinary tract.   A cystoscopy. This is an exam of the inside of the urethra and bladder with a small lighted instrument. It is usually done by a specialist called a urologist.   How is it treated?   UTIs are usually treated with antibiotics. Your provider can also prescribe a medicine called Pyridium to relieve burning and discomfort. (Pyridium turns your urine a dark orange color.)   If the infection is causing fever, pain, or vomiting or you have a severe kidney infection, you may need to stay at the hospital for treatment.   How long will the effects last?   With antibiotic treatment, the symptoms of a bacterial infection stop in 1 to 3 days. Take all of the antibiotic your healthcare provider prescribes, even after the symptoms go away. If you stop taking your medicine before the scheduled end of treatment, the infection may come back   Without treatment, the infection can last a long time. If it is not treated, the infection can permanently damage the bladder and kidneys, or it may spread to the blood. If the infection spreads to the blood, it can be fatal.   How can I take care of myself?   Follow your healthcare provider's treatment. Take all of the antibiotic that your healthcare provider prescribes, even when you feel better. Do not take medicine left over from previous prescriptions.   Drink more fluids, especially water, to help flush bacteria from your system.   If you have a fever:   Take aspirin or acetaminophen to control the fever. Check with your  healthcare provider before you give any medicine that contains aspirin or salicylates to a child or teen. This includes medicines like baby aspirin, some cold medicines, and Pepto Bismol. Children and teens who take aspirin are at risk for a serious illness called Reye's syndrome.   Keep a daily record of your temperature.   A hot water bottle or an electric heating pad on a low setting can help relieve cramps or lower abdominal or back pain. Keep a cloth between your skin and the hot water bottle or heating pad so that you don't burn your skin.   Soaking in a tub for 20 to 30 minutes may help relieve any back or abdominal pain.   Follow your healthcare provider's directions for a follow-up urine test. Your provider may want to test your urine soon after you finish taking the antibiotic.   Call your healthcare provider right away if:   You keep having symptoms after taking an antibiotic for 2 days.   Your symptoms get worse.   You have a fever of 101.5? F (38.6? C) or higher.   You have new vomiting.   You have new pain in your side, back, or belly.   You have any symptoms that worry you.   How can I help prevent urinary tract infection?   You can help prevent UTIs if you:   Drink lots of fluids every day.   Don't wait to go to the bathroom when you feel the need to urinate.   Empty your bladder completely when you urinate.   Use good hygiene when you use the toilet. For example, wipe from front to back to keep rectal bacteria from getting into the vagina and urethra.   Avoid using irritating cosmetics or chemicals in the area of the vagina and urethra (such as strong soaps, feminine hygiene sprays or douches, or scented napkins or panty liners).   Practice safe sex. Always use latex or polyurethane condoms.   Urinate soon after sex.   Keep your genital area clean.   Wear underwear that is all cotton or has a cotton crotch. Pantyhose should also have a cotton crotch. Cotton allows better air circulation than nylon.  Change underwear and pantyhose every day.     Published by [employer].  This content is reviewed periodically and is subject to change as new health information becomes available. The information is intended to inform and educate and is not a replacement for medical evaluation, advice, diagnosis or treatment by a healthcare professional.   Developed by Aleja Ruiz RN, [place], and [employer].   ? 2010 [facility] and/or its affiliates. All Rights Reserved.   Copyright   [employer] 2011            Thank you for choosing [facility].  You may be receiving a survey in the mail from [employer] regarding your visit today.  Please take a few minutes to complete and return the survey to let us know how we are doing.      Our Clinic hours are:  Mondays    7:20 am - 7 pm  Tues -  Fri  7:20 am - 5 pm    Clinic Phone: [phone]    The clinic lab opens at 7:30 am Mon - Fri and appointments are required.    [facility]  Ph. [phone]  Monday-Thursday 8 am - 7pm  Tues/Wed/Fri 8 am - 5:30 pm             BRITTANY Irizarry CNP  [facility] [place]

## 2017-05-21 LAB
BACTERIA SPEC CULT: NORMAL
MICRO REPORT STATUS: NORMAL
SPECIMEN SOURCE: NORMAL

## 2017-05-25 NOTE — TELEPHONE ENCOUNTER
Discussed the importance of blood sugar and blood pressure control and keeping the HA1C < 6.5%. Not on refill protocol

## 2017-07-05 PROBLEM — I10 ESSENTIAL HYPERTENSION: Status: ACTIVE | Noted: 2017-07-05

## 2017-08-28 DIAGNOSIS — I10 ESSENTIAL HYPERTENSION: ICD-10-CM

## 2017-08-28 NOTE — TELEPHONE ENCOUNTER
Lisinopril    Last Written Prescription Date: 07/05/17  Last Fill Quantity: 30, # refills: 1  Last Office Visit with G, P or [facility] prescribing provider: 05/19/17       Potassium   Date Value Ref Range Status   04/19/2017 3.8 3.4 - 5.3 mmol/L Final     Creatinine   Date Value Ref Range Status   04/19/2017 0.88 0.52 - 1.04 mg/dL Final     BP Readings from Last 3 Encounters:   05/19/17 139/89   04/19/17 142/90   09/08/16 (!) 145/94

## 2017-09-05 RX ORDER — LISINOPRIL 10 MG/1
TABLET ORAL
Qty: 90 TABLET | Refills: 1 | Status: SHIPPED | OUTPATIENT
Start: 2017-09-05 | End: 2018-04-06

## 2018-04-06 DIAGNOSIS — I10 ESSENTIAL HYPERTENSION: ICD-10-CM

## 2018-04-09 DIAGNOSIS — I10 ESSENTIAL HYPERTENSION: ICD-10-CM

## 2018-04-09 NOTE — TELEPHONE ENCOUNTER
"Requested Prescriptions   Pending Prescriptions Disp Refills     hydrochlorothiazide (HYDRODIURIL) 25 MG tablet  Last Written Prescription Date:  04/19/2017  Last Fill Quantity: 90,  # refills: 3   Last office visit: 5/19/2017 with prescribing provider:  Nikky   Future Office Visit:    04/19/2017 90 tablet 3     Sig: Take 1 tablet (25 mg) by mouth daily    Diuretics (Including Combos) Protocol Passed    4/9/2018 11:46 AM       Passed - Blood pressure under 140/90 in past 12 months    BP Readings from Last 3 Encounters:   05/19/17 139/89   04/19/17 142/90   09/08/16 (!) 145/94                Passed - Recent (12 mo) or future (30 days) visit within the authorizing provider's specialty    Patient had office visit in the last 12 months or has a visit in the next 30 days with authorizing provider or within the authorizing provider's specialty.  See \"Patient Info\" tab in inbasket, or \"Choose Columns\" in Meds & Orders section of the refill encounter.           Passed - Patient is age 18 or older       Passed - No active pregancy on record       Passed - Normal serum creatinine on file in past 12 months    Recent Labs   Lab Test  04/19/17   0745   CR  0.88             Passed - Normal serum potassium on file in past 12 months    Recent Labs   Lab Test  04/19/17   0745   POTASSIUM  3.8                   Passed - Normal serum sodium on file in past 12 months    Recent Labs   Lab Test  04/19/17   0745   NA  137             Passed - No positive pregnancy test in past 12 months          "

## 2018-04-09 NOTE — TELEPHONE ENCOUNTER
"Requested Prescriptions   Pending Prescriptions Disp Refills     lisinopril (PRINIVIL/ZESTRIL) 10 MG tablet [Pharmacy Med Name: LISINOPRIL 10MG  TAB]  Last Written Prescription Date:  09/05/2017  Last Fill Quantity: 90,  # refills: 1   Last office visit: 5/19/2017 with prescribing provider:  Nikky   Future Office Visit:     90 tablet 1     Sig: TAKE ONE TABLET BY MOUTH ONCE DAILY    ACE Inhibitors (Including Combos) Protocol Failed    4/6/2018  4:18 PM       Failed - Recent (12 mo) or future (30 days) visit within the authorizing provider's specialty    Patient had office visit in the last 12 months or has a visit in the next 30 days with authorizing provider or within the authorizing provider's specialty.  See \"Patient Info\" tab in inbasket, or \"Choose Columns\" in Meds & Orders section of the refill encounter.           Passed - Blood pressure under 140/90 in past 12 months    BP Readings from Last 3 Encounters:   05/19/17 139/89   04/19/17 142/90   09/08/16 (!) 145/94                Passed - Patient is age 18 or older       Passed - No active pregnancy on record       Passed - Normal serum creatinine on file in past 12 months    Recent Labs   Lab Test  04/19/17   0745   CR  0.88            Passed - Normal serum potassium on file in past 12 months    Recent Labs   Lab Test  04/19/17   0745   POTASSIUM  3.8            Passed - No positive pregnancy test in past 12 months          "

## 2018-04-10 RX ORDER — LISINOPRIL 10 MG/1
TABLET ORAL
Qty: 30 TABLET | Refills: 0 | Status: SHIPPED | OUTPATIENT
Start: 2018-04-10

## 2018-04-10 NOTE — TELEPHONE ENCOUNTER
Medication is being filled for 1 time refill only due to:  see below. needs appointment per protocol.   Azalia Maguire RNC

## 2018-04-11 RX ORDER — HYDROCHLOROTHIAZIDE 25 MG/1
25 TABLET ORAL DAILY
Qty: 30 TABLET | Refills: 0 | Status: SHIPPED | OUTPATIENT
Start: 2018-04-11

## 2018-05-19 ENCOUNTER — HEALTH MAINTENANCE LETTER (OUTPATIENT)
Age: 53
End: 2018-05-19

## 2018-06-06 ENCOUNTER — TELEPHONE (OUTPATIENT)
Dept: FAMILY MEDICINE | Facility: CLINIC | Age: 53
End: 2018-06-06

## 2018-06-06 NOTE — TELEPHONE ENCOUNTER
Pt is past due for f/u pap smear if declining recommended colposcopy.  Reminder letter was sent 11/27/17.  LMTC and schedule at [facility].  Left this writer's number in case of questions ([phone]).  If no reply and/or appt within 2 weeks (06/20/18) pt will be considered lost to pap tracking f/u.  Lauren Saunders,    Pap Tracking

## 2018-06-09 ENCOUNTER — HEALTH MAINTENANCE LETTER (OUTPATIENT)
Age: 53
End: 2018-06-09

## 2019-02-15 ENCOUNTER — HEALTH MAINTENANCE LETTER (OUTPATIENT)
Age: 54
End: 2019-02-15

## 2019-10-03 ENCOUNTER — HEALTH MAINTENANCE LETTER (OUTPATIENT)
Age: 54
End: 2019-10-03

## 2020-11-07 ENCOUNTER — HEALTH MAINTENANCE LETTER (OUTPATIENT)
Age: 55
End: 2020-11-07

## 2021-09-11 ENCOUNTER — HEALTH MAINTENANCE LETTER (OUTPATIENT)
Age: 56
End: 2021-09-11

## 2021-12-26 ENCOUNTER — HEALTH MAINTENANCE LETTER (OUTPATIENT)
Age: 56
End: 2021-12-26

## 2022-02-20 ENCOUNTER — HEALTH MAINTENANCE LETTER (OUTPATIENT)
Age: 57
End: 2022-02-20

## 2022-10-29 ENCOUNTER — HEALTH MAINTENANCE LETTER (OUTPATIENT)
Age: 57
End: 2022-10-29

## 2023-04-02 ENCOUNTER — HEALTH MAINTENANCE LETTER (OUTPATIENT)
Age: 58
End: 2023-04-02

## 2024-03-16 ENCOUNTER — HOSPITAL ENCOUNTER (EMERGENCY)
Facility: CLINIC | Age: 59
Discharge: HOME | End: 2024-03-16
Attending: PHYSICIAN ASSISTANT | Admitting: PHYSICIAN ASSISTANT
Payer: COMMERCIAL

## 2024-03-16 VITALS
RESPIRATION RATE: 18 BRPM | TEMPERATURE: 97.8 F | HEART RATE: 90 BPM | DIASTOLIC BLOOD PRESSURE: 82 MMHG | OXYGEN SATURATION: 95 % | SYSTOLIC BLOOD PRESSURE: 155 MMHG

## 2024-03-16 DIAGNOSIS — M79.89 SWELLING OF LEFT MIDDLE FINGER: ICD-10-CM

## 2024-03-16 DIAGNOSIS — M79.645 PAIN OF LEFT MIDDLE FINGER: ICD-10-CM

## 2024-03-16 PROCEDURE — 99203 OFFICE O/P NEW LOW 30 MIN: CPT | Performed by: PHYSICIAN ASSISTANT

## 2024-03-16 PROCEDURE — G0463 HOSPITAL OUTPT CLINIC VISIT: HCPCS | Performed by: PHYSICIAN ASSISTANT

## 2024-03-16 RX ORDER — RIZATRIPTAN BENZOATE 10 MG/1
5-10 TABLET ORAL
COMMUNITY
Start: 2022-04-01

## 2024-03-16 RX ORDER — TRAZODONE HYDROCHLORIDE 100 MG/1
100 TABLET ORAL
COMMUNITY
Start: 2023-03-10

## 2024-03-16 ASSESSMENT — ACTIVITIES OF DAILY LIVING (ADL): ADLS_ACUITY_SCORE: 35

## 2024-03-16 ASSESSMENT — COLUMBIA-SUICIDE SEVERITY RATING SCALE - C-SSRS
6. HAVE YOU EVER DONE ANYTHING, STARTED TO DO ANYTHING, OR PREPARED TO DO ANYTHING TO END YOUR LIFE?: NO
1. IN THE PAST MONTH, HAVE YOU WISHED YOU WERE DEAD OR WISHED YOU COULD GO TO SLEEP AND NOT WAKE UP?: NO
2. HAVE YOU ACTUALLY HAD ANY THOUGHTS OF KILLING YOURSELF IN THE PAST MONTH?: NO

## 2024-03-16 ASSESSMENT — ENCOUNTER SYMPTOMS: CONSTITUTIONAL NEGATIVE: 1

## 2024-03-16 NOTE — ED PROVIDER NOTES
"  History     Chief Complaint   Patient presents with     Finger     Left middle finger pain starting yesterday. No known injury. Has had happen before. Feels like blood vessel popped and bruised. Yesterday was swollen and today is now more so and painful.      HPI  Estella Cruz is a 58 year old female who presents to Urgent Care with complaints of left middle finger discomfort, swelling, and bruising.  Patient states she was driving yesterday when she felt a sudden \"pop\" to her palmar left middle finger.  Patient states this has happened a few times prior in the past in her fingers.  States that usually feels like a \"popped blood vessel.\"  She has not usually developed associated swelling of the finger like she has this time.  She has also developed bruising to the palmar aspect of this hand.  Patient states she does not usually bruise easily elsewhere on her body.  She denies associated preceding injury or trauma to the finger.  Patient is moving her finger without difficulties.  Denies fevers or chills.      Allergies:  Allergies   Allergen Reactions     Shellfish-Derived Products Anaphylaxis     Nkda [No Known Drug Allergy]        Problem List:    Patient Active Problem List    Diagnosis Date Noted     Essential hypertension 07/05/2017     Priority: Medium     IUD (intrauterine device) in place 09/19/2013     Priority: Medium     September 19, 2013 Mirena placed. To be removed in 5 years. [id number] exp-1/2016       Abnormal liver enzymes 02/25/2013     Priority: Medium     Seafood allergy 01/05/2012     Priority: Medium     epipen given         HYPERLIPIDEMIA LDL GOAL <160 10/31/2010     Priority: Medium     S/P LEEP of cervix 09/23/2009     Priority: Medium     Managed by [facility]  Mark GRASYON 1999  9/15/14: Pap - NIL, Neg high risk HPV with endometrial cells present. Plan cotest in 1 year. If ALHAJI 2/3 on biopsy - PAP/HPV co-testing at 12, 24 months. If two negative results repeat co-testing in 3 " years, if negative then routine screening.  12/1/15:Pap-LSIL, + HR HPV. Plan colp.   7/13/16: [place] not done. Tracking updated for 6 mo pap.  8/11/16: [facility] signed on 12/23/15 for [facility]. Pap file closed.   4/19/17:Pap: LSIL, +HR HPV (Neg 16/18). Plan [place]-  11/5/17 [place] not done within 3 months. Tracking updated for 6 mo colp/pap.   06/20/18 Patient is lost to pap tracking follow-up.          FAM HX-CARDIOVAS DIS NEC 03/03/2008     Priority: Medium     Obesity 07/26/2006     Priority: Medium     Problem list name updated by automated process. Provider to review       Hypothyroidism 01/11/2006     Priority: Medium     Levothyroxine  225 mcg    TSH   Date Value Range Status   2/21/2013 2.55  0.4 - 5.0 mU/L Final   12/9/2011 24.60* 0.4 - 5.0 mU/L Final   11/12/2010 5.61* 0.4 - 5.0 mU/L Final   11/9/2009 1.95  0.4 - 5.0 mU/L Final   4/20/2009 0.11* 0.4 - 5.0 mU/L Final                    Comment: Results rechecked  ----------]  Problem list name updated by automated process. Provider to review       Irritable bowel syndrome 01/11/2006     Priority: Medium     Levsin works         Other type of migraine 01/11/2006     Priority: Medium     zomig works  1/11/06 HEADACHE TRIGGERS AND DIARY GIVEN.  AVOID DAILY MEDS FOR HA, LIMIT ZOMIG TO 3 HA PER MO START PROPHALAXIS PROCARDIA 30 MG DAILY THEN RECHECK VISIT IN 1-2 MONTHS    Diagnosis updated by automated process. Provider to review and confirm.       Family history of malignant neoplasm of ovary 01/11/2006     Priority: Medium     BASELINE PELVIC US 1/06  ALSO SUSPECT PCOS       Hyperlipidemia 01/11/2006     Priority: Medium      Lab Test   1/11/06    CHOL       235*       HDL        49*        LDL        157*       TRIG       146        CHOLHDLRA* 4.8        ALT                         0-50 U/L                26     Problem list name updated by automated process. Provider to review          Past Medical History:    Past Medical History:   Diagnosis Date     Irritable  bowel syndrome      Papanicolaou smear of cervix with low grade squamous intraepithelial lesion (LGSIL) 12/1/2015 & 4/19/2017     S/P LEEP of cervix 1999     Variants of migraine, not elsewhere classified, without mention of intractable migraine without mention of status migrainosus        Past Surgical History:    Past Surgical History:   Procedure Laterality Date     HC REMOVAL GALLBLADDER  2003     LEEP TX, CERVICAL         Family History:    Family History   Problem Relation Age of Onset     Cancer Mother         ovarian     Hypertension Mother      Lipids Mother      Heart Disease Father         CABG at 63     Lipids Father      Cancer Maternal Grandmother      C.A.D. Maternal Grandfather      C.A.D. Paternal Grandfather        Social History:  Marital Status:   [2]  Social History     Tobacco Use     Smoking status: Never     Smokeless tobacco: Never   Substance Use Topics     Alcohol use: Yes     Comment: occ     Drug use: No        Medications:    rizatriptan (MAXALT) 10 MG tablet  traZODone (DESYREL) 100 MG tablet  EPINEPHrine 0.3 MG/0.3ML injection  hydrochlorothiazide (HYDRODIURIL) 25 MG tablet  hyoscyamine (LEVSIN/SL) 0.125 MG sublingual tablet  levothyroxine (SYNTHROID/LEVOTHROID) 200 MCG tablet  lisinopril (PRINIVIL/ZESTRIL) 10 MG tablet  ZOLMitriptan (ZOMIG) 5 MG tablet          Review of Systems   Constitutional: Negative.    Musculoskeletal:         Bruising to left middle finger with associated discomfort and swelling   All other systems reviewed and are negative.      Physical Exam   BP: (!) 155/82  Pulse: 90  Temp: 97.8  F (36.6  C)  Resp: 18  SpO2: 95 %      Physical Exam  Constitutional:       General: She is not in acute distress.     Appearance: Normal appearance. She is not ill-appearing, toxic-appearing or diaphoretic.   HENT:      Head: Normocephalic and atraumatic.   Eyes:      Conjunctiva/sclera: Conjunctivae normal.   Cardiovascular:      Pulses: Normal pulses.   Pulmonary:       "Effort: Pulmonary effort is normal.   Musculoskeletal:      Left elbow: Normal.      Left forearm: Normal. No swelling, edema, tenderness or bony tenderness.      Left wrist: Normal. No swelling or bony tenderness. Normal range of motion.      Left hand: Swelling and tenderness present. No bony tenderness. Normal range of motion. Normal strength. Normal sensation. There is no disruption of two-point discrimination. Normal capillary refill. Normal pulse.        Hands:       Comments: Localized ecchymosis, swelling, and mild tenderness to palmar aspect of left middle finger overlying PIP joint.  Full range of motion of the finger without difficulties or pain.  No associated erythema.  Remainder of the finger appears normal.   Skin:     General: Skin is warm and dry.      Capillary Refill: Capillary refill takes less than 2 seconds.   Neurological:      General: No focal deficit present.      Mental Status: She is alert.      Sensory: Sensation is intact.      Motor: Motor function is intact.       ED Course        Procedures      No results found for this or any previous visit (from the past 24 hour(s)).    Medications - No data to display    Assessments & Plan (with Medical Decision Making)     Pt is a 58 year old female who presents to Urgent Care with complaints of left middle finger discomfort, swelling, and bruising.  Patient states she was driving yesterday when she felt a sudden \"pop\" to her palmar left middle finger.  Patient states this has happened a few times prior in the past in her fingers.  States that usually feels like a \"popped blood vessel.\"  She has not usually developed associated swelling of the finger like she has this time.  She has also developed bruising to the palmar aspect of this hand.  Patient states she does not usually bruise easily elsewhere on her body.  She denies associated preceding injury or trauma to the finger.  Patient is moving her finger without difficulties.     Pt is " afebrile on arrival.  Exam as above.  Unclear exact etiology of patient's symptoms especially since this seems to be a recurrent problem for her and are atraumatic.  Does not appear infectious.  Lower suspicion for tenosynovitis given history and exam.  Normal blood flow to the finger.  Recommended she follow-up with orthopedics.  Encouraged symptomatic treatments at home including immobilization, ice, elevation, and anti-inflammatories.  Return precautions were reviewed.  Hand-outs were provided.    Patient was instructed to follow-up with orthopedics for continued care and management.  She is to return to the ED for persistent and/or worsening symptoms.  Patient expressed understanding of the diagnosis and plan and was discharged home in good condition.    I have reviewed the nursing notes.    I have reviewed the findings, diagnosis, plan and need for follow up with the patient.    Discharge Medication List as of 3/16/2024  2:10 PM          Final diagnoses:   Swelling of left middle finger   Pain of left middle finger       3/16/2024   [facility] EMERGENCY DEPT      Disclaimer:  This note consists of symbols derived from keyboarding, dictation and/or voice recognition software.  As a result, there may be errors in the script that have gone undetected.  Please consider this when interpreting information found in this chart.     Shantell Trinidad PA-C  03/16/24 1429       Shantell Trinidad PA-C  03/16/24 1425

## 2024-06-08 ENCOUNTER — HEALTH MAINTENANCE LETTER (OUTPATIENT)
Age: 59
End: 2024-06-08

## 2025-01-05 ENCOUNTER — HOSPITAL ENCOUNTER (EMERGENCY)
Facility: CLINIC | Age: 60
Discharge: HOME | End: 2025-01-05
Attending: PHYSICIAN ASSISTANT
Payer: COMMERCIAL

## 2025-01-05 ENCOUNTER — NURSE TRIAGE (OUTPATIENT)
Dept: NURSING | Facility: CLINIC | Age: 60
End: 2025-01-05
Payer: COMMERCIAL

## 2025-01-05 VITALS
SYSTOLIC BLOOD PRESSURE: 153 MMHG | OXYGEN SATURATION: 96 % | HEART RATE: 83 BPM | DIASTOLIC BLOOD PRESSURE: 79 MMHG | RESPIRATION RATE: 16 BRPM | TEMPERATURE: 98.6 F

## 2025-01-05 DIAGNOSIS — U07.1 COVID-19 VIRUS INFECTION: ICD-10-CM

## 2025-01-05 PROCEDURE — 99213 OFFICE O/P EST LOW 20 MIN: CPT | Performed by: PHYSICIAN ASSISTANT

## 2025-01-05 PROCEDURE — G0463 HOSPITAL OUTPT CLINIC VISIT: HCPCS | Performed by: PHYSICIAN ASSISTANT

## 2025-01-05 RX ORDER — BENZONATATE 100 MG/1
100-200 CAPSULE ORAL 3 TIMES DAILY PRN
Qty: 42 CAPSULE | Refills: 0 | Status: SHIPPED | OUTPATIENT
Start: 2025-01-05 | End: 2025-01-16

## 2025-01-05 ASSESSMENT — COLUMBIA-SUICIDE SEVERITY RATING SCALE - C-SSRS
6. HAVE YOU EVER DONE ANYTHING, STARTED TO DO ANYTHING, OR PREPARED TO DO ANYTHING TO END YOUR LIFE?: NO
2. HAVE YOU ACTUALLY HAD ANY THOUGHTS OF KILLING YOURSELF IN THE PAST MONTH?: NO
1. IN THE PAST MONTH, HAVE YOU WISHED YOU WERE DEAD OR WISHED YOU COULD GO TO SLEEP AND NOT WAKE UP?: NO

## 2025-01-05 ASSESSMENT — ACTIVITIES OF DAILY LIVING (ADL): ADLS_ACUITY_SCORE: 41

## 2025-01-05 NOTE — ED PROVIDER NOTES
History     Chief Complaint   Patient presents with    Covid Concern     HPI  Estella Cruz is a 59 year old female who presents to urgent care requesting treatment for COVID-19 after positive home test 1/20/2025.  She developed symptoms that same day with chills, myalgias, fatigue, watery eyes, sore throat, nasal congestion, cough, shortness of breath with activity.  She denies any objective fever, wheezing, vomiting, diarrhea or abdominal pain.  She has attempted to treat with OTC medications including Sudafed.  She received COVID-vaccine booster in 2023.  No prior history of asthma, COPD or other breathing related disorders.      Allergies:  Allergies   Allergen Reactions    Shellfish-Derived Products Anaphylaxis    Nkda [No Known Drug Allergy]        Problem List:    Patient Active Problem List    Diagnosis Date Noted    Essential hypertension 07/05/2017     Priority: Medium    IUD (intrauterine device) in place 09/19/2013     Priority: Medium     September 19, 2013 Mirena placed. To be removed in 5 years. [id number] exp-1/2016      Abnormal liver enzymes 02/25/2013     Priority: Medium    Seafood allergy 01/05/2012     Priority: Medium     epipen given        HYPERLIPIDEMIA LDL GOAL <160 10/31/2010     Priority: Medium    S/P LEEP of cervix 09/23/2009     Priority: Medium     Managed by [facility]  [place] [facility] 1999  9/15/14: Pap - NIL, Neg high risk HPV with endometrial cells present. Plan cotest in 1 year. If ALHAJI 2/3 on biopsy - PAP/HPV co-testing at 12, 24 months. If two negative results repeat co-testing in 3 years, if negative then routine screening.  12/1/15:Pap-LSIL, + HR HPV. Plan colp.   7/13/16: [place] not done. Tracking updated for 6 mo pap.  8/11/16: MERLIN signed on 12/23/15 for [facility]. Pap file closed.   4/19/17:Pap: LSIL, +HR HPV (Neg 16/18). Plan [place]-  11/5/17 [place] not done within 3 months. Tracking updated for 6 mo colp/pap.   06/20/18 Patient is lost to pap tracking  follow-up.         FAM HX-CARDIOVAS DIS NEC 03/03/2008     Priority: Medium    Obesity 07/26/2006     Priority: Medium     Problem list name updated by automated process. Provider to review      Hypothyroidism 01/11/2006     Priority: Medium     Levothyroxine  225 mcg    TSH   Date Value Range Status   2/21/2013 2.55  0.4 - 5.0 mU/L Final   12/9/2011 24.60* 0.4 - 5.0 mU/L Final   11/12/2010 5.61* 0.4 - 5.0 mU/L Final   11/9/2009 1.95  0.4 - 5.0 mU/L Final   4/20/2009 0.11* 0.4 - 5.0 mU/L Final                    Comment: Results rechecked  ----------]  Problem list name updated by automated process. Provider to review      Irritable bowel syndrome 01/11/2006     Priority: Medium     Levsin works        Other type of migraine 01/11/2006     Priority: Medium     zomig works  1/11/06 HEADACHE TRIGGERS AND DIARY GIVEN.  AVOID DAILY MEDS FOR HA, LIMIT ZOMIG TO 3 HA PER MO START PROPHALAXIS PROCARDIA 30 MG DAILY THEN RECHECK VISIT IN 1-2 MONTHS    Diagnosis updated by automated process. Provider to review and confirm.      Family history of malignant neoplasm of ovary 01/11/2006     Priority: Medium     BASELINE PELVIC US 1/06  ALSO SUSPECT PCOS      Hyperlipidemia 01/11/2006     Priority: Medium      Lab Test   1/11/06    CHOL       235*       HDL        49*        LDL        157*       TRIG       146        CHOLHDLRA* 4.8        ALT                         0-50 U/L                26     Problem list name updated by automated process. Provider to review          Past Medical History:    Past Medical History:   Diagnosis Date    Irritable bowel syndrome     Papanicolaou smear of cervix with low grade squamous intraepithelial lesion (LGSIL) 12/1/2015 & 4/19/2017    S/P LEEP of cervix 1999    Variants of migraine, not elsewhere classified, without mention of intractable migraine without mention of status migrainosus        Past Surgical History:    Past Surgical History:   Procedure Laterality Date    HC REMOVAL GALLBLADDER   2003    LEEP TX, CERVICAL         Family History:    Family History   Problem Relation Age of Onset    Cancer Mother         ovarian    Hypertension Mother     Lipids Mother     Heart Disease Father         CABG at 63    Lipids Father     Cancer Maternal Grandmother     C.A.D. Maternal Grandfather     C.A.D. Paternal Grandfather        Social History:  Marital Status:   [2]  Social History     Tobacco Use    Smoking status: Never    Smokeless tobacco: Never   Substance Use Topics    Alcohol use: Yes     Comment: occ    Drug use: No        Medications:    EPINEPHrine 0.3 MG/0.3ML injection  hydrochlorothiazide (HYDRODIURIL) 25 MG tablet  hyoscyamine (LEVSIN/SL) 0.125 MG sublingual tablet  levothyroxine (SYNTHROID/LEVOTHROID) 200 MCG tablet  lisinopril (PRINIVIL/ZESTRIL) 10 MG tablet  rizatriptan (MAXALT) 10 MG tablet  traZODone (DESYREL) 100 MG tablet  ZOLMitriptan (ZOMIG) 5 MG tablet          Review of Systems  CONSTITUTIONAL:POSITIVE  for chills, myalgias, fatigue and NEGATIVE  for fever   INTEGUMENTARY/SKIN: NEGATIVE for worrisome rashes, moles or lesions  EYES: POSITIVE for watery eyes NEGATIVE for redness, vision changes, eye pain, photophobia   ENT/MOUTH: POSITIVE for nasal congestion and NEGATIVE for sore throat, ear pain   RESP:POSITIVE for cough, shortness of breath  and NEGATIVE for wheezing  GI: NEGATIVE for vomiting, diarrhea, abdominal pain   Physical Exam   BP: (!) 153/79  Pulse: 83  Temp: 98.6  F (37  C)  Resp: 16  SpO2: 96 %  Physical Exam  GENERAL APPEARANCE: healthy, alert and no distress  EYES: EOMI,  PERRL, conjunctiva clear  HENT: ear canals and TM's normal.  Nose and mouth without ulcers, erythema or lesions  NECK: supple, nontender, no lymphadenopathy  RESP: lungs clear to auscultation - no rales, rhonchi or wheezes  CV: regular rates and rhythm, normal S1 S2, no murmur noted  SKIN: no suspicious lesions or rashes  ED Course        Procedures       Critical Care time:  none       No  results found for this or any previous visit (from the past 24 hours).  Medications - No data to display    Assessments & Plan (with Medical Decision Making)     I have reviewed the nursing notes.  I have reviewed the findings, diagnosis, plan and need for follow up with the patient.    Discharge Medication List as of 1/5/2025 12:19 PM        START taking these medications    Details   benzonatate (TESSALON) 100 MG capsule Take 1-2 capsules (100-200 mg) by mouth 3 times daily as needed for cough., Disp-42 capsule, R-0, E-Prescribe      nirmatrelvir and ritonavir (PAXLOVID) 300 mg/100 mg therapy pack Take 3 tablets by mouth 2 times daily for 5 days., Disp-30 tablet, R-0, E-PrescribeDate of symptom onset: 1/3/24; Risk criteria met: Yes; Weight >40 kg Yes; Renal fxn: normal;  Drug-Drug interactions reviewed & addressed: Yes           Final diagnoses:   COVID-19 virus infection     39-year-old female presents to urgent care with concern over testing positive for COVID-19 at home requesting treatment with antiviral medications.  She had elevated blood pressure upon arrival, remainder vital signs stable.  Physical exam findings were benign.  After discussing her/benefits patient agreed to proceed with treatment with Paxlovid.  Medication side effects, interactions, current isolation recommendations discussed. Follow up if no improvement in 7-10 days. Worrisome reasons to return to ER/UC sooner discussed.     Disclaimer: This note consists of symbols derived from keyboarding, dictation, and/or voice recognition software. As a result, there may be errors in the script that have gone undetected.  Please consider this when interpreting information found in the chart.    1/5/2025   [facility] EMERGENCY DEPT       Linda Seymour PA-C  01/09/25 1012

## 2025-01-05 NOTE — TELEPHONE ENCOUNTER
Nurse Triage SBAR    Is this a 2nd Level Triage? NO    Situation: Patient calling.     Background: Covid.     Assessment: Tested positive for covid on 1/3/2025. Symptoms began 1/3/2025. No fever. Stuffy nose, non-productive cough, body aches, head congestion. She feels SOB with walking. Pt would like Paxlovid.      Protocol Recommended Disposition:   Home Care, See HCP Within 4 Hours (Or PCP Triage)    Recommendation:  visit due to SOB.  Pt stated she will go to  in [place] at this time.          Does the patient meet one of the following criteria for ADS visit consideration? No    Reason for Disposition   MILD difficulty breathing (e.g., minimal/no SOB at rest, SOB with walking, pulse <100)    Additional Information   Negative: SEVERE difficulty breathing (e.g., struggling for each breath, speaks in single words)   Negative: Difficult to awaken or acting confused (e.g., disoriented, slurred speech)   Negative: Bluish (or gray) lips or face now   Negative: Shock suspected (e.g., cold/pale/clammy skin, too weak to stand, low BP, rapid pulse)   Negative: Sounds like a life-threatening emergency to the triager   Negative: SEVERE or constant chest pain or pressure  (Exception: Mild central chest pain, present only when coughing.)   Negative: MODERATE difficulty breathing (e.g., speaks in phrases, SOB even at rest, pulse 100-120)   Negative: [1] Headache AND [2] stiff neck (can't touch chin to chest)   Negative: Oxygen level (e.g., pulse oximetry) 90% or lower   Negative: Chest pain or pressure  (Exception: MILD central chest pain, present only when coughing.)   Negative: [1] Drinking very little AND [2] dehydration suspected (e.g., no urine > 12 hours, very dry mouth, very lightheaded)   Negative: Patient sounds very sick or weak to the triager    Protocols used: COVID-19 - Diagnosed or [id number]

## 2025-01-16 ENCOUNTER — OFFICE VISIT (OUTPATIENT)
Dept: FAMILY MEDICINE | Facility: CLINIC | Age: 60
End: 2025-01-16
Payer: COMMERCIAL

## 2025-01-16 VITALS
WEIGHT: 293 LBS | OXYGEN SATURATION: 96 % | HEIGHT: 67 IN | BODY MASS INDEX: 45.99 KG/M2 | SYSTOLIC BLOOD PRESSURE: 123 MMHG | TEMPERATURE: 98.2 F | HEART RATE: 83 BPM | RESPIRATION RATE: 16 BRPM | DIASTOLIC BLOOD PRESSURE: 80 MMHG

## 2025-01-16 DIAGNOSIS — R73.03 PREDIABETES: ICD-10-CM

## 2025-01-16 DIAGNOSIS — Z79.890 HORMONE REPLACEMENT THERAPY: ICD-10-CM

## 2025-01-16 DIAGNOSIS — G43.809 OTHER MIGRAINE WITHOUT STATUS MIGRAINOSUS, NOT INTRACTABLE: ICD-10-CM

## 2025-01-16 DIAGNOSIS — E78.5 HYPERLIPIDEMIA LDL GOAL <160: ICD-10-CM

## 2025-01-16 DIAGNOSIS — Z91.013 SEAFOOD ALLERGY: ICD-10-CM

## 2025-01-16 DIAGNOSIS — I10 ESSENTIAL HYPERTENSION: ICD-10-CM

## 2025-01-16 DIAGNOSIS — L82.1 SEBORRHEIC KERATOSES: ICD-10-CM

## 2025-01-16 DIAGNOSIS — E66.01 SEVERE OBESITY (BMI >= 40) (H): Primary | ICD-10-CM

## 2025-01-16 DIAGNOSIS — G47.33 OBSTRUCTIVE SLEEP APNEA SYNDROME: ICD-10-CM

## 2025-01-16 DIAGNOSIS — E03.9 HYPOTHYROIDISM, UNSPECIFIED TYPE: ICD-10-CM

## 2025-01-16 DIAGNOSIS — E78.5 HYPERLIPIDEMIA, UNSPECIFIED HYPERLIPIDEMIA TYPE: ICD-10-CM

## 2025-01-16 PROBLEM — M21.6X1 ACQUIRED EQUINUS DEFORMITY OF BOTH FEET: Status: ACTIVE | Noted: 2019-11-06

## 2025-01-16 PROBLEM — C54.1 CARCINOMA OF ENDOMETRIUM (H): Status: ACTIVE | Noted: 2019-05-15

## 2025-01-16 PROBLEM — Z85.42 HISTORY OF UTERINE CANCER: Status: ACTIVE | Noted: 2019-05-15

## 2025-01-16 PROBLEM — M72.2 PLANTAR FASCIITIS: Status: ACTIVE | Noted: 2019-11-06

## 2025-01-16 PROBLEM — M17.9 OSTEOARTHRITIS OF KNEE: Status: ACTIVE | Noted: 2022-09-02

## 2025-01-16 PROBLEM — M21.6X2 ACQUIRED EQUINUS DEFORMITY OF BOTH FEET: Status: ACTIVE | Noted: 2019-11-06

## 2025-01-16 LAB
ALBUMIN SERPL BCG-MCNC: 4.3 G/DL (ref 3.5–5.2)
ALP SERPL-CCNC: 89 U/L (ref 40–150)
ALT SERPL W P-5'-P-CCNC: 27 U/L (ref 0–50)
ANION GAP SERPL CALCULATED.3IONS-SCNC: 10 MMOL/L (ref 7–15)
AST SERPL W P-5'-P-CCNC: 21 U/L (ref 0–45)
BILIRUB SERPL-MCNC: 0.3 MG/DL
BUN SERPL-MCNC: 17.2 MG/DL (ref 6–20)
CALCIUM SERPL-MCNC: 9.8 MG/DL (ref 8.8–10.4)
CHLORIDE SERPL-SCNC: 99 MMOL/L (ref 98–107)
CHOLEST SERPL-MCNC: 248 MG/DL
CREAT SERPL-MCNC: 0.92 MG/DL (ref 0.51–0.95)
EGFRCR SERPLBLD CKD-EPI 2021: 72 ML/MIN/1.73M2
ERYTHROCYTE [DISTWIDTH] IN BLOOD BY AUTOMATED COUNT: 12.8 % (ref 10–15)
EST. AVERAGE GLUCOSE BLD GHB EST-MCNC: 134 MG/DL
FASTING STATUS PATIENT QL REPORTED: YES
FASTING STATUS PATIENT QL REPORTED: YES
GLUCOSE SERPL-MCNC: 95 MG/DL (ref 70–99)
HBA1C MFR BLD: 6.3 % (ref 0–5.6)
HCO3 SERPL-SCNC: 29 MMOL/L (ref 22–29)
HCT VFR BLD AUTO: 46.8 % (ref 35–47)
HDLC SERPL-MCNC: 63 MG/DL
HGB BLD-MCNC: 15.4 G/DL (ref 11.7–15.7)
LDLC SERPL CALC-MCNC: 149 MG/DL
MCH RBC QN AUTO: 31.6 PG (ref 26.5–33)
MCHC RBC AUTO-ENTMCNC: 32.9 G/DL (ref 31.5–36.5)
MCV RBC AUTO: 96 FL (ref 78–100)
NONHDLC SERPL-MCNC: 185 MG/DL
PLATELET # BLD AUTO: 412 10E3/UL (ref 150–450)
POTASSIUM SERPL-SCNC: 4.1 MMOL/L (ref 3.4–5.3)
PROT SERPL-MCNC: 7.9 G/DL (ref 6.4–8.3)
RBC # BLD AUTO: 4.88 10E6/UL (ref 3.8–5.2)
SODIUM SERPL-SCNC: 138 MMOL/L (ref 135–145)
TRIGL SERPL-MCNC: 180 MG/DL
TSH SERPL DL<=0.005 MIU/L-ACNC: 5.85 UIU/ML (ref 0.3–4.2)
VIT D+METAB SERPL-MCNC: 24 NG/ML (ref 20–50)
WBC # BLD AUTO: 7.4 10E3/UL (ref 4–11)

## 2025-01-16 RX ORDER — LISINOPRIL 20 MG/1
20 TABLET ORAL
COMMUNITY
Start: 2025-01-06

## 2025-01-16 RX ORDER — EPINEPHRINE 0.3 MG/.3ML
0.3 INJECTION SUBCUTANEOUS
Qty: 0.3 ML | Refills: 0 | Status: SHIPPED | OUTPATIENT
Start: 2025-01-16

## 2025-01-16 RX ORDER — ESTRADIOL 0.1 MG/D
FILM, EXTENDED RELEASE TRANSDERMAL
COMMUNITY

## 2025-01-16 NOTE — ASSESSMENT & PLAN NOTE
The patient continues to struggle with what she believes are menopausal symptoms since her hysterectomy at age 52 due to uterine cancer.  Recommended Dr. Vasquez at [facility] in consultation regarding her hormone replacement therapy.    Orders:    Ob/Gyn  Referral; Future

## 2025-01-16 NOTE — ASSESSMENT & PLAN NOTE
The patient has a longstanding history of hypothyroidism and is currently on levothyroxine 200 mcg daily.  Last TSH was checked in February nearly a year ago.  Updated TSH checked today.    Orders:    TSH; Future

## 2025-01-16 NOTE — ASSESSMENT & PLAN NOTE
Orders:    EPINEPHrine (ANY BX GENERIC EQUIV) 0.3 MG/0.3ML injection 2-pack; Inject 0.3 mLs (0.3 mg) into the muscle once as needed for anaphylaxis.

## 2025-01-16 NOTE — ASSESSMENT & PLAN NOTE
We had a good conversation today regarding migraine headaches.  She is interested in the possibility of Botox injections as her daughter gets quite a bit of relief from that.  She will continue to take Maxalt as needed but a referral was placed for consultation with neurology to discuss further.    Orders:    Adult Neurology  Referral; Future

## 2025-01-16 NOTE — PROGRESS NOTES
Preventive Care Visit  [facility]  ADEN BRITTON MD, Family Medicine  Jan 16, 2025      Assessment & Plan   Assessment & Plan  Severe obesity (BMI >= 40) (H)  We spent a majority of the visit discussing her history and challenges related to severe obesity.  I reviewed her weight history as well as factors including nutrition, eating behaviors and exercise.  We discussed a recommended nutrition plan for medical weight management which I do believe she already follows.  She struggles with excess appetite and portion control.  I think she is an excellent candidate for a GLP-1 medication and ultimately after conversation today a prescription for Zepbound 2.5 mg weekly was provided.  Follow-up in 4 months.    Orders:    tirzepatide-Weight Management (ZEPBOUND) 2.5 MG/0.5ML prefilled pen; Inject 0.5 mLs (2.5 mg) subcutaneously every 7 days.    Comprehensive metabolic panel (BMP + Alb, Alk Phos, ALT, AST, Total. Bili, TP); Future    Lipid Profile (Chol, Trig, HDL, LDL calc); Future    Hemoglobin A1c; Future    CBC with platelets; Future    Vitamin D Deficiency; Future    Essential hypertension  The patient's blood pressure appears to be under good control on lisinopril 20 mg daily.         Hyperlipidemia LDL goal <160         Hypothyroidism  The patient has a longstanding history of hypothyroidism and is currently on levothyroxine 200 mcg daily.  Last TSH was checked in February nearly a year ago.  Updated TSH checked today.    Orders:    TSH; Future    Seafood allergy    Orders:    EPINEPHrine (ANY BX GENERIC EQUIV) 0.3 MG/0.3ML injection 2-pack; Inject 0.3 mLs (0.3 mg) into the muscle once as needed for anaphylaxis.    Hormone replacement therapy  The patient continues to struggle with what she believes are menopausal symptoms since her hysterectomy at age 52 due to uterine cancer.  Recommended Dr. Vasquez at [facility] in consultation regarding her hormone replacement  "therapy.    Orders:    Ob/Gyn  Referral; Future    Other migraine without status migrainosus, not intractable  We had a good conversation today regarding migraine headaches.  She is interested in the possibility of Botox injections as her daughter gets quite a bit of relief from that.  She will continue to take Maxalt as needed but a referral was placed for consultation with neurology to discuss further.    Orders:    Adult Neurology  Referral; Future    Obstructive sleep apnea syndrome  She uses her CPAP machine nightly.         Hyperlipidemia, unspecified hyperlipidemia type  I checked an updated lipid panel today.         Seborrheic keratoses                BMI  Estimated body mass index is 47.17 kg/m  as calculated from the following:    Height as of this encounter: 1.702 m (5' 7\").    Weight as of this encounter: 136.6 kg (301 lb 3.2 oz).   Weight management plan: Discussed healthy diet and exercise guidelines        Yue Soria is a 59 year old who presents today to establish care.  The patient is on a number of chronic medications and would like to discuss concerns regarding obesity today.  She would also like me to check some skin spots on her legs.  Establish Care and check some skin spots        1/16/2025     9:13 AM   Additional Questions   Roomed by as   Accompanied by self         1/16/2025     9:13 AM   Patient Reported Additional Medications   Patient reports taking the following new medications no          History of Present Illness       Reason for visit:  Establish care   She is taking medications regularly.    Health Care Directive  Patient does not have a Health Care Directive: Discussed advance care planning with patient; information given to patient to review.          Today's PHQ-2 Score:       1/16/2025     9:06 AM   PHQ-2 ( 1999 [employer])   Q1: Little interest or pleasure in doing things 0   Q2: Feeling down, depressed or hopeless 0   PHQ-2 Score 0    Q1: Little " interest or pleasure in doing things Not at all   Q2: Feeling down, depressed or hopeless Not at all   PHQ-2 Score 0       Patient-reported       Social History     Tobacco Use    Smoking status: Never    Smokeless tobacco: Never   Substance Use Topics    Alcohol use: Yes     Comment: occ    Drug use: No          Mammogram Screening - Mammogram every 1-2 years updated in Health Maintenance based on mutual decision making      History of abnormal Pap smear: Status post hysterectomy with removal of cervix and no history of CIN2 or greater or cervical cancer. Health Maintenance and Surgical History updated.        Latest Ref Rng & Units 4/19/2017     7:46 AM 4/19/2017     7:42 AM 12/1/2015     8:39 AM   PAP / HPV   PAP (Historical)   LSIL     HPV 16 DNA NEG Negative   Negative    HPV 18 DNA NEG Negative   Negative    Other HR HPV NEG Positive   Positive      ASCVD Risk   The ASCVD Risk score (Michel LYN, et al., 2019) failed to calculate for the following reasons:    Cannot find a previous HDL lab    Cannot find a previous total cholesterol lab      Reviewed and updated as needed this visit by Provider                    Patient Active Problem List   Diagnosis    Hypothyroidism    Irritable bowel syndrome    Other migraine without status migrainosus, not intractable    Hyperlipidemia    Seafood allergy    Abnormal liver enzymes    Essential hypertension    History of uterine cancer    Acquired equinus deformity of both feet    Obstructive sleep apnea syndrome    Osteoarthritis of knee    Plantar fasciitis    Hormone replacement therapy    Severe obesity (BMI >= 40) (H)     Past Surgical History:   Procedure Laterality Date    DAVINCI HYSTERECTOMY TOTAL, BILATERAL SALPINGO-OOPHORECTOMY, COMBINED      HC REMOVAL GALLBLADDER  01/01/2003    LEEP TX, CERVICAL         Social History     Tobacco Use    Smoking status: Never    Smokeless tobacco: Never   Substance Use Topics    Alcohol use: Yes     Comment: occ      "Family History   Problem Relation Age of Onset    Cancer Mother         ovarian    Hypertension Mother     Lipids Mother     Heart Disease Father         CABG at 63    Lipids Father     Cancer Maternal Grandmother     C.A.D. Maternal Grandfather     BLAINE.A.D. Paternal Grandfather          Current Outpatient Medications   Medication Sig Dispense Refill    EPINEPHrine (ANY BX GENERIC EQUIV) 0.3 MG/0.3ML injection 2-pack Inject 0.3 mLs (0.3 mg) into the muscle once as needed for anaphylaxis. 0.3 mL 0    hydrochlorothiazide (HYDRODIURIL) 25 MG tablet Take 1 tablet (25 mg) by mouth daily (Needs follow-up appointment for this medication) 30 tablet 0    hyoscyamine (LEVSIN/SL) 0.125 MG sublingual tablet Take 1 tablet (0.125 mg) by mouth every 4 hours as needed for cramping 30 tablet 0    levothyroxine (SYNTHROID/LEVOTHROID) 200 MCG tablet Take 1 tablet (200 mcg) by mouth daily Annual due with labs for additional refills. 90 tablet 3    lisinopril (ZESTRIL) 20 MG tablet 20 mg.      rizatriptan (MAXALT) 10 MG tablet Take 5-10 mg by mouth      tirzepatide-Weight Management (ZEPBOUND) 2.5 MG/0.5ML prefilled pen Inject 0.5 mLs (2.5 mg) subcutaneously every 7 days. 2 mL 0    traZODone (DESYREL) 100 MG tablet Take 100 mg by mouth      estradiol (VIVELLE-DOT) 0.1 MG/24HR bi-weekly patch APPLY 1 PATCH topically TWICE A WEEK. ROTATE SITES. REMOVE OLD PATCH BEFORE APPLYING NEW.*       Allergies   Allergen Reactions    Shellfish-Derived Products Anaphylaxis    Nkda [No Known Drug Allergy]           Objective    Exam  /80 (BP Location: Left arm, Patient Position: Left side, Cuff Size: Adult Large)   Pulse 83   Temp 98.2  F (36.8  C) (Oral)   Resp 16   Ht 1.702 m (5' 7\")   Wt 136.6 kg (301 lb 3.2 oz)   LMP 09/08/2014   SpO2 96%   BMI 47.17 kg/m     Estimated body mass index is 47.17 kg/m  as calculated from the following:    Height as of this encounter: 1.702 m (5' 7\").    Weight as of this encounter: 136.6 kg (301 lb 3.2 " oz).    Physical Exam  GENERAL: alert and no distress  RESP: lungs clear to auscultation - no rales, rhonchi or wheezes  CV: regular rate and rhythm, normal S1 S2, no S3 or S4, no murmur, click or rub, no peripheral edema   SKIN: multiple small scaly lesions with good demarcation     54 minutes spent with patient    The longitudinal plan of care for the diagnosis(es)/condition(s) as documented were addressed during this visit. Due to the added complexity in care, I will continue to support Estella in the subsequent management and with ongoing continuity of care.    Signed Electronically by: ADEN BRITTON MD

## 2025-01-16 NOTE — ASSESSMENT & PLAN NOTE
We spent a majority of the visit discussing her history and challenges related to severe obesity.  I reviewed her weight history as well as factors including nutrition, eating behaviors and exercise.  We discussed a recommended nutrition plan for medical weight management which I do believe she already follows.  She struggles with excess appetite and portion control.  I think she is an excellent candidate for a GLP-1 medication and ultimately after conversation today a prescription for Zepbound 2.5 mg weekly was provided.  Follow-up in 4 months.    Orders:    tirzepatide-Weight Management (ZEPBOUND) 2.5 MG/0.5ML prefilled pen; Inject 0.5 mLs (2.5 mg) subcutaneously every 7 days.    Comprehensive metabolic panel (BMP + Alb, Alk Phos, ALT, AST, Total. Bili, TP); Future    Lipid Profile (Chol, Trig, HDL, LDL calc); Future    Hemoglobin A1c; Future    CBC with platelets; Future    Vitamin D Deficiency; Future

## 2025-01-27 ENCOUNTER — MYC MEDICAL ADVICE (OUTPATIENT)
Dept: FAMILY MEDICINE | Facility: CLINIC | Age: 60
End: 2025-01-27
Payer: COMMERCIAL

## 2025-01-27 DIAGNOSIS — E66.01 SEVERE OBESITY (BMI >= 40) (H): Primary | ICD-10-CM

## 2025-01-31 ENCOUNTER — TRANSFERRED RECORDS (OUTPATIENT)
Dept: HEALTH INFORMATION MANAGEMENT | Facility: CLINIC | Age: 60
End: 2025-01-31
Payer: COMMERCIAL

## 2025-01-31 LAB — PHQ9 SCORE: 3

## 2025-02-07 ENCOUNTER — TRANSFERRED RECORDS (OUTPATIENT)
Dept: HEALTH INFORMATION MANAGEMENT | Facility: CLINIC | Age: 60
End: 2025-02-07
Payer: COMMERCIAL

## 2025-03-03 ENCOUNTER — E-VISIT (OUTPATIENT)
Dept: FAMILY MEDICINE | Facility: CLINIC | Age: 60
End: 2025-03-03
Payer: COMMERCIAL

## 2025-03-03 DIAGNOSIS — E66.01 SEVERE OBESITY (BMI >= 40) (H): Primary | ICD-10-CM

## 2025-03-03 PROCEDURE — 99207 PR NON-BILLABLE SERV PER CHARTING: CPT | Performed by: FAMILY MEDICINE

## 2025-03-03 NOTE — PATIENT INSTRUCTIONS
Thank you for checking in. I have adjusted your medication to manage your symptoms. The following medication was sent to your pharmacy:  No orders of the defined types were placed in this encounter.       View your full visit summary for details by clicking on the link below. Your pharmacist will be able to address any questions you may have about the medication.       Please send an eVisit to follow up on this medication change in 4 weeks, or sooner if needed.     Thank you for choosing us for your care.

## 2025-03-24 ENCOUNTER — E-VISIT (OUTPATIENT)
Dept: FAMILY MEDICINE | Facility: CLINIC | Age: 60
End: 2025-03-24
Payer: COMMERCIAL

## 2025-03-24 DIAGNOSIS — R11.0 NAUSEA: Primary | ICD-10-CM

## 2025-03-24 PROCEDURE — 99207 PR NON-BILLABLE SERV PER CHARTING: CPT | Performed by: FAMILY MEDICINE

## 2025-03-24 RX ORDER — ONDANSETRON 4 MG/1
4 TABLET, ORALLY DISINTEGRATING ORAL EVERY 6 HOURS PRN
Qty: 30 TABLET | Refills: 0 | Status: SHIPPED | OUTPATIENT
Start: 2025-03-24

## 2025-03-31 ENCOUNTER — E-VISIT (OUTPATIENT)
Dept: FAMILY MEDICINE | Facility: CLINIC | Age: 60
End: 2025-03-31
Payer: COMMERCIAL

## 2025-03-31 DIAGNOSIS — E66.01 SEVERE OBESITY (BMI >= 40) (H): Primary | ICD-10-CM

## 2025-03-31 PROCEDURE — 99207 PR NON-BILLABLE SERV PER CHARTING: CPT | Performed by: FAMILY MEDICINE

## 2025-04-11 ENCOUNTER — E-VISIT (OUTPATIENT)
Dept: FAMILY MEDICINE | Facility: CLINIC | Age: 60
End: 2025-04-11
Payer: COMMERCIAL

## 2025-04-11 DIAGNOSIS — G47.00 INSOMNIA, UNSPECIFIED TYPE: Primary | ICD-10-CM

## 2025-04-11 DIAGNOSIS — K58.9 IRRITABLE BOWEL SYNDROME, UNSPECIFIED TYPE: ICD-10-CM

## 2025-04-14 RX ORDER — TRAZODONE HYDROCHLORIDE 100 MG/1
100 TABLET ORAL AT BEDTIME
Qty: 90 TABLET | Refills: 3 | Status: SHIPPED | OUTPATIENT
Start: 2025-04-14

## 2025-04-14 RX ORDER — HYOSCYAMINE SULFATE 0.12 MG/1
0.12 TABLET SUBLINGUAL EVERY 4 HOURS PRN
Qty: 30 TABLET | Refills: 0 | Status: SHIPPED | OUTPATIENT
Start: 2025-04-14

## 2025-04-14 NOTE — PATIENT INSTRUCTIONS
I am glad you are doing well. I have refilled your medication:  Orders Placed This Encounter   Medications     hyoscyamine (LEVSIN/SL) 0.125 MG sublingual tablet     Sig: Take 1 tablet (0.125 mg) by mouth every 4 hours as needed for cramping.     Dispense:  30 tablet     Refill:  0     traZODone (DESYREL) 100 MG tablet     Sig: Take 1 tablet (100 mg) by mouth at bedtime.     Dispense:  90 tablet     Refill:  3        View your full visit summary for details by clicking on the link below. Your pharmacist will be able to address any questions you may have about the medication.      Thank you for choosing us for your care.

## 2025-04-27 DIAGNOSIS — E03.9 HYPOTHYROIDISM, UNSPECIFIED TYPE: ICD-10-CM

## 2025-04-28 ENCOUNTER — VIRTUAL VISIT (OUTPATIENT)
Dept: FAMILY MEDICINE | Facility: CLINIC | Age: 60
End: 2025-04-28
Payer: COMMERCIAL

## 2025-04-28 DIAGNOSIS — E66.01 SEVERE OBESITY (BMI >= 40) (H): Primary | ICD-10-CM

## 2025-04-28 DIAGNOSIS — E03.8 OTHER SPECIFIED HYPOTHYROIDISM: ICD-10-CM

## 2025-04-28 DIAGNOSIS — M79.671 PAIN IN BOTH FEET: ICD-10-CM

## 2025-04-28 DIAGNOSIS — I10 ESSENTIAL HYPERTENSION: ICD-10-CM

## 2025-04-28 DIAGNOSIS — M79.672 PAIN IN BOTH FEET: ICD-10-CM

## 2025-04-28 DIAGNOSIS — G47.33 OBSTRUCTIVE SLEEP APNEA SYNDROME: ICD-10-CM

## 2025-04-28 PROCEDURE — 98006 SYNCH AUDIO-VIDEO EST MOD 30: CPT | Performed by: FAMILY MEDICINE

## 2025-04-28 RX ORDER — LEVOTHYROXINE SODIUM 200 UG/1
200 TABLET ORAL DAILY
Qty: 90 TABLET | Refills: 3 | Status: SHIPPED | OUTPATIENT
Start: 2025-04-28

## 2025-04-28 RX ORDER — LEVOTHYROXINE SODIUM 25 UG/1
25 TABLET ORAL
Qty: 90 TABLET | Refills: 3 | Status: SHIPPED | OUTPATIENT
Start: 2025-04-28

## 2025-04-28 RX ORDER — LISINOPRIL AND HYDROCHLOROTHIAZIDE 20; 25 MG/1; MG/1
1 TABLET ORAL DAILY
Qty: 90 TABLET | Refills: 3 | Status: SHIPPED | OUTPATIENT
Start: 2025-04-28

## 2025-04-28 NOTE — ASSESSMENT & PLAN NOTE
She uses her CPAP nightly.  Orders:    tirzepatide-Weight Management (ZEPBOUND) 7.5 MG/0.5ML prefilled pen; Inject 0.5 mLs (7.5 mg) subcutaneously every 7 days.

## 2025-04-28 NOTE — ASSESSMENT & PLAN NOTE
Overall she has seen a good weight loss thus far with Zepbound 5 mg weekly.  I do think she is ready to move up to the 7.5 mg weekly dose and a prescription was provided.  I asked her to follow-up in clinic in 4 months.  Orders:    tirzepatide-Weight Management (ZEPBOUND) 7.5 MG/0.5ML prefilled pen; Inject 0.5 mLs (7.5 mg) subcutaneously every 7 days.

## 2025-04-28 NOTE — PROGRESS NOTES
Estella is a 59 year old who is being evaluated via a billable video visit.    How would you like to obtain your AVS? MyChart  If the video visit is dropped, the invitation should be resent by: Text to cell phone: [phone]  Will anyone else be joining your video visit? No      Assessment & Plan   Assessment & Plan  Severe obesity (BMI >= 40) (H)  Overall she has seen a good weight loss thus far with Zepbound 5 mg weekly.  I do think she is ready to move up to the 7.5 mg weekly dose and a prescription was provided.  I asked her to follow-up in clinic in 4 months.  Orders:    tirzepatide-Weight Management (ZEPBOUND) 7.5 MG/0.5ML prefilled pen; Inject 0.5 mLs (7.5 mg) subcutaneously every 7 days.    Obstructive sleep apnea syndrome  She uses her CPAP nightly.  Orders:    tirzepatide-Weight Management (ZEPBOUND) 7.5 MG/0.5ML prefilled pen; Inject 0.5 mLs (7.5 mg) subcutaneously every 7 days.    Other specified hypothyroidism  She on Friday came in for an updated TSH which is still pending.  Orders:    levothyroxine (SYNTHROID/LEVOTHROID) 200 MCG tablet; Take 1 tablet (200 mcg) by mouth daily. Annual due with labs for additional refills.    Essential hypertension  Refill provided today and I combined her lisinopril and hydrochlorothiazide for a 20/25 mg tablet.  Orders:    lisinopril-hydrochlorothiazide (ZESTORETIC) 20-25 MG tablet; Take 1 tablet by mouth daily.    Pain in both feet  Referral to podiatry was provided today.  Orders:    Orthopedic  Referral; Future        Subjective   Estella is a 59 year old who presents today via a virtual video visit for follow-up regarding medical weight management.  The patient overall is very pleased with her weight loss as a result of starting Zepbound.  She did have a complication of constipation that became quite severe while on the 7.5 mg weekly dose.  The patient went back down to 5 mg weekly and with good hydration and stool softeners, was able to resolve her  "constipation.  She is now focusing on staying well-hydrated and managing constipation and feels ready to move back up to 7.5 mg weekly dose.  She is adhering to a healthy diet and actually following weight watchers plan.  The medication really helps her stick with the plan nutritionally.  The patient does state that she continues to have bilateral foot pain that limits her ability to walk.  She has been using a stationary bicycle successfully to get in her exercise.  Weight Loss (/)      4/28/2025     8:25 AM   Additional Questions   Roomed by as   Accompanied by self         4/28/2025     8:25 AM   Patient Reported Additional Medications   Patient reports taking the following new medications no     History of Present Illness       Reason for visit:  Follow up for ongoing treatment    She eats 4 or more servings of fruits and vegetables daily.She consumes 0 sweetened beverage(s) daily.She exercises with enough effort to increase her heart rate 9 or less minutes per day.  She exercises with enough effort to increase her heart rate 4 days per week.   She is taking medications regularly.            Objective    Vitals - Patient Reported  Weight (Patient Reported): 120.4 kg (265 lb 8 oz)  Height (Patient Reported): 170.2 cm (5' 7\")  BMI (Based on Pt Reported Ht/Wt): 41.58        Physical Exam   GENERAL: alert and no distress  EYES: Eyes grossly normal to inspection.  No discharge or erythema, or obvious scleral/conjunctival abnormalities.  RESP: No audible wheeze, cough, or visible cyanosis.    SKIN: Visible skin clear. No significant rash, abnormal pigmentation or lesions.  NEURO: Cranial nerves grossly intact.  Mentation and speech appropriate for age.  PSYCH: Appropriate affect, tone, and pace of words        Video-Visit Details    Type of service:  Video Visit   Originating Location (pt. Location): Home    Distant Location (provider location):  On-site  Platform used for Video Visit: Phylicia    The longitudinal plan " of care for the diagnosis(es)/condition(s) as documented were addressed during this visit. Due to the added complexity in care, I will continue to support Estella in the subsequent management and with ongoing continuity of care.    Signed Electronically by: ADEN BRITTON MD

## 2025-04-28 NOTE — ASSESSMENT & PLAN NOTE
She on Friday came in for an updated TSH which is still pending.  Orders:    levothyroxine (SYNTHROID/LEVOTHROID) 200 MCG tablet; Take 1 tablet (200 mcg) by mouth daily. Annual due with labs for additional refills.

## 2025-04-28 NOTE — ASSESSMENT & PLAN NOTE
Refill provided today and I combined her lisinopril and hydrochlorothiazide for a 20/25 mg tablet.  Orders:    lisinopril-hydrochlorothiazide (ZESTORETIC) 20-25 MG tablet; Take 1 tablet by mouth daily.

## 2025-04-29 ENCOUNTER — PATIENT OUTREACH (OUTPATIENT)
Dept: CARE COORDINATION | Facility: CLINIC | Age: 60
End: 2025-04-29
Payer: COMMERCIAL

## 2025-04-30 DIAGNOSIS — E03.9 ACQUIRED HYPOTHYROIDISM: Primary | ICD-10-CM

## 2025-04-30 RX ORDER — LEVOTHYROXINE SODIUM 175 UG/1
175 TABLET ORAL
Qty: 90 TABLET | Refills: 1 | Status: SHIPPED | OUTPATIENT
Start: 2025-04-30

## 2025-05-01 ENCOUNTER — PATIENT OUTREACH (OUTPATIENT)
Dept: CARE COORDINATION | Facility: CLINIC | Age: 60
End: 2025-05-01
Payer: COMMERCIAL

## 2025-06-02 ENCOUNTER — OFFICE VISIT (OUTPATIENT)
Dept: FAMILY MEDICINE | Facility: CLINIC | Age: 60
End: 2025-06-02
Payer: COMMERCIAL

## 2025-06-02 VITALS
DIASTOLIC BLOOD PRESSURE: 72 MMHG | OXYGEN SATURATION: 98 % | WEIGHT: 258.6 LBS | HEIGHT: 67 IN | TEMPERATURE: 97.8 F | HEART RATE: 81 BPM | SYSTOLIC BLOOD PRESSURE: 111 MMHG | RESPIRATION RATE: 14 BRPM | BODY MASS INDEX: 40.59 KG/M2

## 2025-06-02 DIAGNOSIS — R73.03 PREDIABETES: ICD-10-CM

## 2025-06-02 DIAGNOSIS — I10 ESSENTIAL HYPERTENSION: ICD-10-CM

## 2025-06-02 DIAGNOSIS — E03.9 ACQUIRED HYPOTHYROIDISM: ICD-10-CM

## 2025-06-02 DIAGNOSIS — E78.5 HYPERLIPIDEMIA, UNSPECIFIED HYPERLIPIDEMIA TYPE: ICD-10-CM

## 2025-06-02 DIAGNOSIS — G47.33 OBSTRUCTIVE SLEEP APNEA SYNDROME: ICD-10-CM

## 2025-06-02 DIAGNOSIS — E66.01 SEVERE OBESITY (BMI >= 40) (H): Primary | ICD-10-CM

## 2025-06-02 PROCEDURE — 99214 OFFICE O/P EST MOD 30 MIN: CPT | Mod: 25 | Performed by: FAMILY MEDICINE

## 2025-06-02 PROCEDURE — 90471 IMMUNIZATION ADMIN: CPT | Performed by: FAMILY MEDICINE

## 2025-06-02 PROCEDURE — 90707 MMR VACCINE SC: CPT | Performed by: FAMILY MEDICINE

## 2025-06-02 PROCEDURE — G2211 COMPLEX E/M VISIT ADD ON: HCPCS | Performed by: FAMILY MEDICINE

## 2025-06-02 NOTE — PROGRESS NOTES
"  Assessment & Plan   Assessment & Plan  Severe obesity (BMI >= 40) (H)  Successful 3 pound weight loss to date and tolerating Zepbound well.  She is finding it very beneficial in terms of its appetite suppression but does feel that an increase in dosage would be helpful.  I increased her dose to 10 mg weekly.  I also wrote a note to her insurance as they have communicated to her that Zepbound will no longer be considered preferred treatment.  However, with her history of obstructive sleep apnea and the degree of obesity, I really would prefer her to stay with the Zepbound if possible.  Orders:    tirzepatide-Weight Management (ZEPBOUND) 10 MG/0.5ML prefilled pen; Inject 0.5 mLs (10 mg) subcutaneously every 7 days.    Prediabetes  Previous A1c as high as 6.3.  However, the last A1c looked quite good.       Acquired hypothyroidism  Most recent TSH was in the normal range.       Hyperlipidemia, unspecified hyperlipidemia type         Obstructive sleep apnea syndrome  Uses CPAP nightly.       Essential hypertension  Currently under excellent control.  Continue Zestoretic 20/25 mg daily.           Yue Soria is a 59 year old who presents today for medical weight management follow-up visit.  The patient continues to find Zepbound very well-tolerated and very effective.  She is quite pleased with the results thus far with a 43 pound weight loss.  She is finding that the medication provides significant reduction in appetite and improve satiety which she said is \"freeing.\"  She still eats 3 meals daily and is making healthy choices.  She is eating mostly whole foods including vegetables and fruit.  She is managing her constipation with some stool softeners and lots of water.  Weight Loss      6/2/2025     3:45 PM   Additional Questions   Roomed by as   Accompanied by self         6/2/2025     3:45 PM   Patient Reported Additional Medications   Patient reports taking the following new medications no       " "  Objective    /72 (BP Location: Left arm, Patient Position: Left side, Cuff Size: Adult Large)   Pulse 81   Temp 97.8  F (36.6  C) (Oral)   Resp 14   Ht 1.702 m (5' 7\")   Wt 117.3 kg (258 lb 9.6 oz)   LMP 09/08/2014   SpO2 98%   BMI 40.50 kg/m    Body mass index is 40.5 kg/m .  Physical Exam   GENERAL: alert and no distress    The longitudinal plan of care for the diagnosis(es)/condition(s) as documented were addressed during this visit. Due to the added complexity in care, I will continue to support Estella in the subsequent management and with ongoing continuity of care.        Signed Electronically by: ADEN BRITTON MD    "

## 2025-06-02 NOTE — LETTER
2025    Estella Cruz   1965        To Whom it May Concern;    Estella Cruz is a 59-year-old patient of mine whom I have been treating for severe obesity with Zepbound since January.  The patient has successfully lost 43 pounds to date or about 14% of her initial weight.  In addition to severe obesity, the patient has a history of obstructive sleep apnea, hypertension and prediabetes.  I feel that Zepbound is the best medication for her and has the best likelihood of treating her sleep apnea in addition to her severe obesity.  Recent studies have found that Zepbound does provide superior weight loss when studied head-to-head against Wegovy.  I am writing to  request that the patient be able to continue on the Zepbound that she is tolerating and finding very effective.    Sincerely,        ADEN BRITTON MD

## 2025-06-02 NOTE — ASSESSMENT & PLAN NOTE
Successful 3 pound weight loss to date and tolerating Zepbound well.  She is finding it very beneficial in terms of its appetite suppression but does feel that an increase in dosage would be helpful.  I increased her dose to 10 mg weekly.  I also wrote a note to her insurance as they have communicated to her that Zepbound will no longer be considered preferred treatment.  However, with her history of obstructive sleep apnea and the degree of obesity, I really would prefer her to stay with the Zepbound if possible.  Orders:    tirzepatide-Weight Management (ZEPBOUND) 10 MG/0.5ML prefilled pen; Inject 0.5 mLs (10 mg) subcutaneously every 7 days.

## 2025-06-15 ENCOUNTER — HEALTH MAINTENANCE LETTER (OUTPATIENT)
Age: 60
End: 2025-06-15

## 2025-06-30 ENCOUNTER — TELEPHONE (OUTPATIENT)
Dept: FAMILY MEDICINE | Facility: CLINIC | Age: 60
End: 2025-06-30
Payer: COMMERCIAL

## 2025-06-30 NOTE — TELEPHONE ENCOUNTER
Prior Authorization Request  Who s requesting:  Provider  Pharmacy Name and Location: [id number]  Medication Name: tirzepatide-Weight Management (ZEPBOUND) 10 MG/0.5ML prefilled pen   Insurance Plan: Blue Plus  Insurance Member ID Number:  [id number]   CoverMyMeds Key:   Informed patient that prior authorizations can take up to 10 business days for response:   NA  Okay to leave a detailed message: N/A    [facility] formulary change effective 7/1/25

## 2025-07-02 NOTE — TELEPHONE ENCOUNTER
Retail Pharmacy Prior Authorization Team   Phone: [phone]    Prior Authorization Not Needed per Insurance    Medication: ZEPBOUND 10 MG/0.5ML SC SOAJ  Insurance Company: [employer] - Phone [phone] Fax [phone]  Expected CoPay: $    Pharmacy Filling the Rx: [id number] - [place], [place] - [address] [place] [address]  Pharmacy Notified: YES  Patient Notified: YES (notified pharmacy)

## 2025-07-29 ENCOUNTER — E-VISIT (OUTPATIENT)
Dept: FAMILY MEDICINE | Facility: CLINIC | Age: 60
End: 2025-07-29
Payer: COMMERCIAL

## 2025-07-29 DIAGNOSIS — R82.90 BAD ODOR OF URINE: Primary | ICD-10-CM

## 2025-08-27 ENCOUNTER — E-VISIT (OUTPATIENT)
Dept: FAMILY MEDICINE | Facility: CLINIC | Age: 60
End: 2025-08-27
Payer: COMMERCIAL

## 2025-08-27 DIAGNOSIS — E66.01 SEVERE OBESITY (BMI >= 40) (H): ICD-10-CM

## 2025-08-27 DIAGNOSIS — G47.33 OBSTRUCTIVE SLEEP APNEA SYNDROME: Primary | ICD-10-CM

## 2025-08-27 PROCEDURE — 99207 PR NON-BILLABLE SERV PER CHARTING: CPT | Performed by: FAMILY MEDICINE
